# Patient Record
Sex: MALE | Race: WHITE | NOT HISPANIC OR LATINO | Employment: FULL TIME | URBAN - METROPOLITAN AREA
[De-identification: names, ages, dates, MRNs, and addresses within clinical notes are randomized per-mention and may not be internally consistent; named-entity substitution may affect disease eponyms.]

---

## 2017-03-23 ENCOUNTER — TELEPHONE (OUTPATIENT)
Dept: INTERNAL MEDICINE | Facility: CLINIC | Age: 42
End: 2017-03-23

## 2017-03-23 NOTE — TELEPHONE ENCOUNTER
Form completed and faxed to both the Guardian and Mr. Roy.  See copy under media tab.    ----- Message from Dedra Granados sent at 3/22/2017  9:48 AM EDT -----  Regarding: FMLA PAPERWORK  CANDACE LEI    PATIENT CHECKING STATUS OF FMLA PAPERWORK.  HE PHONED THE COMPANY TO RECEIVE AND THEY SAY THEY HAVEN'T GOTTEN IT AS OF YET.    [phone] - ML CONTACT #

## 2017-08-29 ENCOUNTER — OFFICE VISIT (OUTPATIENT)
Dept: INTERNAL MEDICINE | Facility: CLINIC | Age: 42
End: 2017-08-29

## 2017-08-29 VITALS
DIASTOLIC BLOOD PRESSURE: 78 MMHG | HEIGHT: 70 IN | WEIGHT: 162 LBS | OXYGEN SATURATION: 98 % | SYSTOLIC BLOOD PRESSURE: 128 MMHG | RESPIRATION RATE: 16 BRPM | HEART RATE: 80 BPM | BODY MASS INDEX: 23.19 KG/M2

## 2017-08-29 DIAGNOSIS — K29.70 VIRAL GASTRITIS: Primary | ICD-10-CM

## 2017-08-29 LAB
EXPIRATION DATE: NORMAL
FLUAV AG NPH QL: NORMAL
FLUBV AG NPH QL: NORMAL
INTERNAL CONTROL: NORMAL
Lab: NORMAL

## 2017-08-29 PROCEDURE — 99213 OFFICE O/P EST LOW 20 MIN: CPT | Performed by: INTERNAL MEDICINE

## 2017-08-29 PROCEDURE — 87804 INFLUENZA ASSAY W/OPTIC: CPT | Performed by: INTERNAL MEDICINE

## 2017-08-29 RX ORDER — ONDANSETRON HYDROCHLORIDE 8 MG/1
8 TABLET, FILM COATED ORAL EVERY 8 HOURS PRN
Qty: 30 TABLET | Refills: 0 | Status: SHIPPED | OUTPATIENT
Start: 2017-08-29 | End: 2018-08-15

## 2017-08-29 NOTE — PROGRESS NOTES
Subjective   Phillip Roy is a 42 y.o. male.     History of Present Illness   43 yo male with pmhx one day of recurrent nbnb emesis.  He is here with his wife who is having similar symptoms. He has low grade fever and sister in law with similar symptoms  He is down weight, unable to keep fluids down.  No known fever but 99 here.  He has 3 younger children not ill. Had small amount of ETOH at social event over weekend.  The following portions of the patient's history were reviewed and updated as appropriate: allergies, current medications, past family history, past medical history, past social history, past surgical history and problem list.    Review of Systems   Constitutional: Negative.    HENT: Negative.    Eyes: Negative.    Gastrointestinal: Positive for abdominal pain, nausea and vomiting. Negative for diarrhea.   Musculoskeletal:        Feeling weak, diffusely ill   Neurological: Negative.    All other systems reviewed and are negative.      Objective   Physical Exam   Constitutional: He is oriented to person, place, and time. He appears well-developed and well-nourished. No distress.   HENT:   Head: Normocephalic and atraumatic.   Right Ear: External ear normal.   Left Ear: External ear normal.   Mouth/Throat: Oropharynx is clear and moist. No oropharyngeal exudate.   MMM   Eyes: Pupils are equal, round, and reactive to light. Right eye exhibits no discharge. Left eye exhibits no discharge. No scleral icterus.   Cardiovascular: Normal rate and regular rhythm.    No murmur heard.  Pulmonary/Chest: Effort normal. No respiratory distress.   Abdominal: Soft. He exhibits no distension and no mass. There is no tenderness. There is no rebound.   Neurological: He is alert and oriented to person, place, and time.   Skin: Skin is warm.   clammy   Psychiatric: He has a normal mood and affect. His behavior is normal.   Nursing note and vitals reviewed.  Flu neg    Assessment/Plan   Phillip was seen today for  vomiting.    Diagnoses and all orders for this visit:    Viral gastritis  -     POCT Influenza A/B  -     ondansetron (ZOFRAN) 8 MG tablet; Take 1 tablet by mouth Every 8 (Eight) Hours As Needed for Nausea or Vomiting.    Increase fluids, rest  Anticipate resolution of symptoms  Reassurance  [place] diet until pain resolved.   Call if not better 2-3 days or if new symptoms develop.

## 2017-09-21 ENCOUNTER — OFFICE VISIT (OUTPATIENT)
Dept: FAMILY MEDICINE CLINIC | Facility: CLINIC | Age: 42
End: 2017-09-21

## 2017-09-21 VITALS
BODY MASS INDEX: 24.2 KG/M2 | OXYGEN SATURATION: 98 % | HEIGHT: 70 IN | SYSTOLIC BLOOD PRESSURE: 118 MMHG | WEIGHT: 169 LBS | TEMPERATURE: 99 F | HEART RATE: 71 BPM | DIASTOLIC BLOOD PRESSURE: 68 MMHG | RESPIRATION RATE: 14 BRPM

## 2017-09-21 DIAGNOSIS — Z12.5 SCREENING FOR PROSTATE CANCER: ICD-10-CM

## 2017-09-21 DIAGNOSIS — Z00.00 ENCOUNTER FOR HEALTH MAINTENANCE EXAMINATION: Primary | ICD-10-CM

## 2017-09-21 DIAGNOSIS — Z13.220 SCREENING FOR LIPID DISORDERS: ICD-10-CM

## 2017-09-21 DIAGNOSIS — Z13.1 SCREENING FOR DIABETES MELLITUS: ICD-10-CM

## 2017-09-21 DIAGNOSIS — J40 SINOBRONCHITIS: ICD-10-CM

## 2017-09-21 DIAGNOSIS — J32.9 SINOBRONCHITIS: ICD-10-CM

## 2017-09-21 LAB
ALBUMIN SERPL-MCNC: 4.7 G/DL (ref 3.5–5.2)
ALBUMIN/GLOB SERPL: 1.9 G/DL
ALP SERPL-CCNC: 68 U/L (ref 39–117)
ALT SERPL-CCNC: 23 U/L (ref 1–41)
AST SERPL-CCNC: 24 U/L (ref 1–40)
BILIRUB SERPL-MCNC: 0.5 MG/DL (ref 0.1–1.2)
BUN SERPL-MCNC: 15 MG/DL (ref 6–20)
BUN/CREAT SERPL: 16.1 (ref 7–25)
CALCIUM SERPL-MCNC: 9.6 MG/DL (ref 8.6–10.5)
CHLORIDE SERPL-SCNC: 100 MMOL/L (ref 98–107)
CHOLEST SERPL-MCNC: 163 MG/DL (ref 0–200)
CO2 SERPL-SCNC: 26.9 MMOL/L (ref 22–29)
CREAT SERPL-MCNC: 0.93 MG/DL (ref 0.76–1.27)
GLOBULIN SER CALC-MCNC: 2.5 GM/DL
GLUCOSE SERPL-MCNC: 115 MG/DL (ref 65–99)
HBA1C MFR BLD: 5.11 % (ref 4.8–5.6)
HDLC SERPL-MCNC: 47 MG/DL (ref 40–60)
LDLC SERPL CALC-MCNC: 71 MG/DL (ref 0–100)
POTASSIUM SERPL-SCNC: 3.9 MMOL/L (ref 3.5–5.2)
PROT SERPL-MCNC: 7.2 G/DL (ref 6–8.5)
PSA SERPL-MCNC: 0.92 NG/ML (ref 0–4)
SODIUM SERPL-SCNC: 142 MMOL/L (ref 136–145)
TRIGL SERPL-MCNC: 223 MG/DL (ref 0–150)
VLDLC SERPL CALC-MCNC: 44.6 MG/DL (ref 5–40)

## 2017-09-21 PROCEDURE — 99213 OFFICE O/P EST LOW 20 MIN: CPT | Performed by: FAMILY MEDICINE

## 2017-09-21 PROCEDURE — 99396 PREV VISIT EST AGE 40-64: CPT | Performed by: FAMILY MEDICINE

## 2017-09-21 RX ORDER — AMOXICILLIN AND CLAVULANATE POTASSIUM 875; 125 MG/1; MG/1
1 TABLET, FILM COATED ORAL 2 TIMES DAILY
Qty: 20 TABLET | Refills: 0 | Status: SHIPPED | OUTPATIENT
Start: 2017-09-21 | End: 2017-10-01

## 2017-09-21 RX ORDER — PREDNISONE 20 MG/1
40 TABLET ORAL DAILY
Qty: 10 TABLET | Refills: 0 | Status: SHIPPED | OUTPATIENT
Start: 2017-09-21 | End: 2018-08-15

## 2017-09-21 NOTE — PROGRESS NOTES
Subjective   Phillip Roy is a 42 y.o. male.     Chief Complaint   Patient presents with   • Establish Care     Patient needs to establish a care. He needs to have prostate exam.    • Sinus Problem     Patient is having sinus pressure and pain.        HPI     Patient is here for health maintenance exam as well as to discuss a problem that is new to me.  Patient denies any past medical history.  He takes no chronic medications.  He is currently suffering from increased sinus congestion, drainage, and headache for the last 2-3 weeks.  Symptoms are getting worse despite over-the-counter therapy.  No fever or chills.  He is a former smoker.  He drinks about 5 alcoholic beverages a week.  No recreational drug use.  Tries to maintain healthy diet and exercise regimen.  He does have a strong family history of prostate and colon cancer.    The following portions of the patient's history were reviewed and updated as appropriate: allergies, current medications, past family history, past medical history, past social history, past surgical history and problem list.    Review of Systems   HENT: Positive for sinus pain and sinus pressure.    All other systems reviewed and are negative.      Objective  Vitals:    09/21/17 1235   BP: 118/68   Pulse: 71   Resp: 14   Temp: 99 °F (37.2 °C)   SpO2: 98%        Physical Exam   Constitutional: He is oriented to person, place, and time. He appears well-developed and well-nourished. No distress.   HENT:   Head: Normocephalic and atraumatic.   Right Ear: External ear and ear canal normal. No drainage, swelling or tenderness. Tympanic membrane is bulging. Tympanic membrane is not injected and not erythematous. Tympanic membrane mobility is normal. No middle ear effusion. Decreased hearing is noted.   Left Ear: External ear and ear canal normal. No drainage, swelling or tenderness. Tympanic membrane is bulging. Tympanic membrane is not injected and not erythematous. Tympanic membrane mobility  is normal.  No middle ear effusion. Decreased hearing is noted.   Nose: Mucosal edema and rhinorrhea present. Right sinus exhibits maxillary sinus tenderness and frontal sinus tenderness. Left sinus exhibits maxillary sinus tenderness and frontal sinus tenderness.   Mouth/Throat: Uvula is midline. Posterior oropharyngeal erythema present. No oropharyngeal exudate, posterior oropharyngeal edema or tonsillar abscesses. No tonsillar exudate.   Eyes: Conjunctivae and EOM are normal. Pupils are equal, round, and reactive to light. Right eye exhibits no discharge. Left eye exhibits no discharge. No scleral icterus.   Neck: Normal range of motion. Neck supple.   Cardiovascular: Normal rate, regular rhythm and normal heart sounds.  Exam reveals no friction rub.    No murmur heard.  Pulmonary/Chest: Effort normal and breath sounds normal. No respiratory distress. He has no wheezes. He has no rales.   Abdominal: Soft. Bowel sounds are normal. He exhibits no distension. There is no tenderness. There is no rebound and no guarding.   Lymphadenopathy:     He has no cervical adenopathy.   Neurological: He is alert and oriented to person, place, and time.   Skin: Skin is warm and dry. He is not diaphoretic.   Nursing note and vitals reviewed.        Current Outpatient Prescriptions:   •  Cetirizine HCl (ZYRTEC PO), Take 10 mg by mouth Daily., Disp: , Rfl:   •  amoxicillin-clavulanate (AUGMENTIN) 875-125 MG per tablet, Take 1 tablet by mouth 2 (Two) Times a Day for 10 days. 1 tab PO BID x 10 days for infection, Disp: 20 tablet, Rfl: 0  •  ondansetron (ZOFRAN) 8 MG tablet, Take 1 tablet by mouth Every 8 (Eight) Hours As Needed for Nausea or Vomiting., Disp: 30 tablet, Rfl: 0  •  predniSONE (DELTASONE) 20 MG tablet, Take 2 tablets by mouth Daily., Disp: 10 tablet, Rfl: 0    Procedures    Lab Results (most recent)     None          Assessment/Plan   Phillip was seen today for establish care and sinus problem.    Diagnoses and all orders  for this visit:    Encounter for health maintenance examination    Screening for lipid disorders  -     Lipid Panel    Screening for diabetes mellitus  -     Comprehensive Metabolic Panel  -     Hemoglobin A1c    Screening for prostate cancer  -     PSA    Sinobronchitis  -     amoxicillin-clavulanate (AUGMENTIN) 875-125 MG per tablet; Take 1 tablet by mouth 2 (Two) Times a Day for 10 days. 1 tab PO BID x 10 days for infection  -     predniSONE (DELTASONE) 20 MG tablet; Take 2 tablets by mouth Daily.    Preventative health maintenance and screening as above.  We'll treat as above for sinobronchitis.  Follow-up as needed.    Return for As needed.      Daryl Purdy MD

## 2018-05-11 ENCOUNTER — OUTSIDE FACILITY SERVICE (OUTPATIENT)
Dept: SURGERY | Facility: CLINIC | Age: 43
End: 2018-05-11

## 2018-05-11 PROCEDURE — 45378 DIAGNOSTIC COLONOSCOPY: CPT | Performed by: SURGERY

## 2018-08-15 ENCOUNTER — OFFICE VISIT (OUTPATIENT)
Dept: FAMILY MEDICINE CLINIC | Facility: CLINIC | Age: 43
End: 2018-08-15

## 2018-08-15 VITALS
SYSTOLIC BLOOD PRESSURE: 112 MMHG | HEIGHT: 69 IN | WEIGHT: 173 LBS | OXYGEN SATURATION: 98 % | BODY MASS INDEX: 25.62 KG/M2 | HEART RATE: 71 BPM | TEMPERATURE: 98.6 F | DIASTOLIC BLOOD PRESSURE: 72 MMHG

## 2018-08-15 DIAGNOSIS — B34.9 VIRAL ILLNESS: Primary | ICD-10-CM

## 2018-08-15 PROCEDURE — 99213 OFFICE O/P EST LOW 20 MIN: CPT | Performed by: NURSE PRACTITIONER

## 2018-08-15 RX ORDER — IBUPROFEN 400 MG/1
400 TABLET ORAL
COMMUNITY
End: 2020-08-13

## 2018-08-20 ENCOUNTER — OFFICE VISIT (OUTPATIENT)
Dept: FAMILY MEDICINE CLINIC | Facility: CLINIC | Age: 43
End: 2018-08-20

## 2018-08-20 VITALS
HEIGHT: 69 IN | OXYGEN SATURATION: 100 % | TEMPERATURE: 98.4 F | DIASTOLIC BLOOD PRESSURE: 74 MMHG | WEIGHT: 174 LBS | HEART RATE: 69 BPM | BODY MASS INDEX: 25.77 KG/M2 | SYSTOLIC BLOOD PRESSURE: 104 MMHG

## 2018-08-20 DIAGNOSIS — G47.33 OSA (OBSTRUCTIVE SLEEP APNEA): Primary | ICD-10-CM

## 2018-08-20 PROCEDURE — 99213 OFFICE O/P EST LOW 20 MIN: CPT | Performed by: FAMILY MEDICINE

## 2018-08-20 RX ORDER — CETIRIZINE HYDROCHLORIDE 10 MG/1
10 TABLET ORAL DAILY
COMMUNITY
End: 2020-08-13

## 2018-08-20 NOTE — PROGRESS NOTES
Phillip Roy is a 43 y.o. male.     Chief Complaint   Patient presents with   • Snoring     Patient was told by his wife that he snores and quits breathing while he is sleeping. Patient is feeling very fatigued leticia after 8 hours of sleep    • Skin Problem     Patient tried to remove on a skin tag under his right arm that he wants checked        HPI     Patient presents the office today stating that his wife has recently stated that his snoring has gotten quite bad and that he will often times stop breathing during the night.  He states that he'll wake up the morning typically feeling exhausted and not having had a restful night sleep.  Does have a family history of sleep apnea.    The following portions of the patient's history were reviewed and updated as appropriate: allergies, current medications, past family history, past medical history, past social history, past surgical history and problem list.    Review of Systems   Constitutional: Positive for fatigue.   All other systems reviewed and are negative.      Objective  Vitals:    08/20/18 1314   BP: 104/74   Pulse: 69   Temp: 98.4 °F (36.9 °C)   SpO2: 100%       Physical Exam   Constitutional: He is oriented to person, place, and time. He appears well-developed and well-nourished. No distress.   HENT:   Head: Normocephalic and atraumatic.   Right Ear: External ear normal.   Left Ear: External ear normal.   Nose: Nose normal.   Mouth/Throat: Oropharynx is clear and moist.   Eyes: Pupils are equal, round, and reactive to light. Conjunctivae and EOM are normal. Right eye exhibits no discharge. Left eye exhibits no discharge. No scleral icterus.   Cardiovascular: Normal rate, regular rhythm and normal heart sounds.    Pulmonary/Chest: Effort normal and breath sounds normal. No respiratory distress. He has no wheezes. He has no rales.   Abdominal: Soft. Bowel sounds are normal. He exhibits no distension. There is no tenderness.   Neurological: He is alert and  oriented to person, place, and time.   Skin: Skin is warm and dry. He is not diaphoretic.   Nursing note and vitals reviewed.        Current Outpatient Prescriptions:   •  cetirizine (zyrTEC) 10 MG tablet, Take 10 mg by mouth Daily., Disp: , Rfl:   •  ibuprofen (ADVIL,MOTRIN) 400 MG tablet, Take 400 mg by mouth., Disp: , Rfl:     Procedures    Lab Results (most recent)     None              Phillip was seen today for snoring and skin problem.    Diagnoses and all orders for this visit:    RAMSES (obstructive sleep apnea)  -     Ambulatory Referral to Sleep Medicine      Advised patient on the need to get a sleep study.  We will place a referral.    Return for As needed.      Daryl Purdy MD

## 2018-10-02 ENCOUNTER — OFFICE VISIT (OUTPATIENT)
Dept: SLEEP MEDICINE | Facility: HOSPITAL | Age: 43
End: 2018-10-02
Attending: INTERNAL MEDICINE

## 2018-10-02 VITALS
HEART RATE: 70 BPM | BODY MASS INDEX: 25.21 KG/M2 | WEIGHT: 170.2 LBS | DIASTOLIC BLOOD PRESSURE: 74 MMHG | OXYGEN SATURATION: 98 % | HEIGHT: 69 IN | SYSTOLIC BLOOD PRESSURE: 122 MMHG

## 2018-10-02 DIAGNOSIS — R06.83 SNORING: ICD-10-CM

## 2018-10-02 DIAGNOSIS — G47.10 HYPERSOMNIA: Primary | ICD-10-CM

## 2018-10-02 PROCEDURE — G0463 HOSPITAL OUTPT CLINIC VISIT: HCPCS

## 2018-10-02 NOTE — PROGRESS NOTES
[facility]- Sleep Disorders Center      Patient Care Team:  Daryl Purdy MD as PCP - General (Family Medicine)    Referring Provider: Daryl Purdy MD    Chief complaint:   Feeling tired throughout the day    History of present illness:    This is a 43-year-old male patient who presented for evaluation of daytime fatigue and sleepiness.  He stated that this has been going on for years.  He always attributed that to work, stress and having kids.  He said that he can fall asleep very easily if not engaged in activities.    Sleep schedule:  -Bedtime: 10:30 PM on the weekdays.  11 to 11:30 PM on the weekends  -Sleep latency: 10-15 minutes  -Wake up time: 5:45 AM on the weekdays and 7 AM on the weekends , does not feel refreshed  -Nocturnal awakening: None.  He stated that his very hard sleeper.  -Perceived sleep hours: 7-8    ESS: Total score: 13     He indicated rare episodes of sleep paralysis upon awakening which may occur once a year.  He denies sleep hallucination.  He denies cataplexy.  He stated that he jerks in his sleep sometimes which may awaken him but denies symptoms of restless leg syndrome.  He said that he was asleep walker when he was a child.  He also indicated occasional vivid dreams.    He said that he snores but it does not bother him but does not awaken him at night.  His snoring disturb his wife who has awakened him intermittently to change position.  He was told that he occasionally stops breathing as well.  He also reported grinding his teeth at night as told by his dentist.    Review of Systems  Constitutional: No fever or chills. No changes in appetite.   ENMT: No sinus congestion, postnasal drip, hoarsness  Cardiovascular: No chest pain, palpitation or legs swelling.    Respiratory: No dyspnea, cough or wheezing.  Gastrointestinal: No constipation, diarrhea, abdominal pain or acid reflux  Neurology: No headache, weakness, numbness or dizziness.   Musculoskeletal:  "No joints pain, stiffness or swelling.   Psychiatry: He has anxiety and depression (not a formal diagnosis).  He does not take treatment for that.  Hem/Lymphatic: No swollen glands or easy bruising.  Integumentary: No rash.  Endocrinology: No excessive thirst, cold or warm intolerance.   Urinary: No dysuria, bloody urine or frequent urination.     History  Past Medical History:   Diagnosis Date   • Viral gastritis 8/29/2017   ,   Past Surgical History:   Procedure Laterality Date   • COLONOSCOPY     ,   Family History   Problem Relation Age of Onset   • Cancer Mother    • Colon cancer Father    ,   Social History   Substance Use Topics   • Smoking status: Former Smoker     Types: Cigarettes     Quit date: 2001   • Smokeless tobacco: Former User   • Alcohol use 3.0 oz/week     5 Standard drinks or equivalent per week    and Allergies:  Patient has no known allergies.    Medications:    Current Outpatient Prescriptions:   •  cetirizine (zyrTEC) 10 MG tablet, Take 10 mg by mouth Daily., Disp: , Rfl:   •  ibuprofen (ADVIL,MOTRIN) 400 MG tablet, Take 400 mg by mouth., Disp: , Rfl:       Vital Signs:  Vitals:    10/02/18 1300   BP: 122/74   Pulse: 70   SpO2: 98%   Weight: 77.2 kg (170 lb 3.2 oz)   Height: 175.3 cm (69\")     Body mass index is 25.13 kg/m².  Neck Circumference: 16 inches     Physical Exam:  Neck Circumference: 16 inches     Constitutional: Not in acute distress.  Eyes: Injected conjunctiva, EOMI.  ENMT: Watson score 3. Mallampati score 1. No oral thrush. Tonsils grade 1..  Neck:No thyromegaly.    Heart: Regular rhythm and rate, no murmur  Lungs: Good and equal air entry bilaterally. No crackles or wheezing.         Abdomen: Obese.   Extremities: No cyanosis, clubbing or pitting edema. Moves all extremities.  Neuro: Conscious, alert, oriented x3.   Psych: Appropriate mood and affect.    Integumentary: No rash  Lymphatic: No palpable cervical or supraclavicular lymph nodes.         Assessment:  Diagnoses " and all orders for this visit:    Hypersomnia  -     Polysomnography 4 or more parameters; Future  -     Multiple sleep latency test without CPAP; Future    Snoring        Plan:  I'll get an in lab polysomnography followed by MSLT.  Patient exhibited some symptoms of hypersomnolence despite sleeping adequate hours concerning for central hypersomnia disorder, specifically idiopathic hypersomnia due to strong sleep inertia.  I explained the diagnosis of the patient and testing.    Concerning snoring, I do not have a strong suspicion for obstructive sleep apnea.  Weight loss and positional therapy are recommended for this issue.        Caio Kent MD  10/02/18  1:40 PM    This note was dictated utilizing Dragon dictation

## 2018-11-05 ENCOUNTER — HOSPITAL ENCOUNTER (OUTPATIENT)
Dept: SLEEP MEDICINE | Facility: HOSPITAL | Age: 43
Discharge: HOME | End: 2018-11-05
Attending: INTERNAL MEDICINE | Admitting: INTERNAL MEDICINE

## 2018-11-05 DIAGNOSIS — G47.10 HYPERSOMNIA: ICD-10-CM

## 2018-11-05 PROCEDURE — 95810 POLYSOM 6/> YRS 4/> PARAM: CPT

## 2018-11-06 ENCOUNTER — HOSPITAL ENCOUNTER (OUTPATIENT)
Dept: SLEEP MEDICINE | Facility: HOSPITAL | Age: 43
Discharge: HOME | End: 2018-11-06
Attending: INTERNAL MEDICINE | Admitting: INTERNAL MEDICINE

## 2018-11-06 ENCOUNTER — TRANSCRIBE ORDERS (OUTPATIENT)
Dept: SLEEP MEDICINE | Facility: HOSPITAL | Age: 43
End: 2018-11-06

## 2018-11-06 DIAGNOSIS — G47.10 HYPERSOMNIA: Primary | ICD-10-CM

## 2018-11-06 DIAGNOSIS — G47.10 HYPERSOMNIA: ICD-10-CM

## 2018-11-06 LAB
AMPHET+METHAMPHET UR QL: NEGATIVE
BARBITURATES UR QL SCN: NEGATIVE
BENZODIAZ UR QL SCN: NEGATIVE
CANNABINOIDS SERPL QL: NEGATIVE
COCAINE UR QL: NEGATIVE
METHADONE UR QL SCN: NEGATIVE
OPIATES UR QL: NEGATIVE
OXYCODONE UR QL SCN: NEGATIVE

## 2018-11-06 PROCEDURE — 80307 DRUG TEST PRSMV CHEM ANLYZR: CPT | Performed by: INTERNAL MEDICINE

## 2018-11-06 PROCEDURE — 95805 MULTIPLE SLEEP LATENCY TEST: CPT

## 2018-11-28 ENCOUNTER — TELEPHONE (OUTPATIENT)
Dept: SLEEP MEDICINE | Facility: HOSPITAL | Age: 43
End: 2018-11-28

## 2018-11-28 NOTE — TELEPHONE ENCOUNTER
Tech called pt, pt answered and stated was driving and would call back. Patient needs to be scheduled follow up appt with Dr. Kent to discuss testing results. MAB

## 2018-12-11 ENCOUNTER — TELEPHONE (OUTPATIENT)
Dept: SLEEP MEDICINE | Facility: HOSPITAL | Age: 43
End: 2018-12-11

## 2018-12-11 ENCOUNTER — OFFICE VISIT (OUTPATIENT)
Dept: SLEEP MEDICINE | Facility: HOSPITAL | Age: 43
End: 2018-12-11
Attending: INTERNAL MEDICINE

## 2018-12-11 VITALS
HEIGHT: 69 IN | BODY MASS INDEX: 25.33 KG/M2 | WEIGHT: 171 LBS | HEART RATE: 80 BPM | DIASTOLIC BLOOD PRESSURE: 81 MMHG | SYSTOLIC BLOOD PRESSURE: 135 MMHG

## 2018-12-11 DIAGNOSIS — G47.33 OBSTRUCTIVE SLEEP APNEA, ADULT: Primary | ICD-10-CM

## 2018-12-11 DIAGNOSIS — G47.10 HYPERSOMNIA: ICD-10-CM

## 2018-12-11 PROCEDURE — G0463 HOSPITAL OUTPT CLINIC VISIT: HCPCS

## 2018-12-11 NOTE — PROGRESS NOTES
[facility]- Sleep Disorders Center                          Chief Complaint:   Follow up result of the sleep study.    History of present illness:   This is a 43 y.o. male  Patient with no PMH, complaining of daytime sleepiness. He had 1 episode of sleep paralysis. He also reported loud snoring which disturbs his wife.    Sleep apnea/hypersomnia were suspected.  Patient underwent PSG followed by emesis of tea.    PSG showed minimal sleep apnea per RDI criteria.  MSL T showed mean sleep latency of 12 minutes.  Patient is here today to discuss the result of the past.    His symptoms are the same since the last visit.  However, he reported some symptoms of lack of motivation and fatigue which bother him the most rather than snoring.    ESS: Total score: 9     REVIEW OF SYSTEMS:   HEENT: No nasal congestion or postnasal drip   CARDIOVASCULAR: No chest pain, chest pressure or chest discomfort. No palpitations or edema.   RESPIRATORY: No shortness of breath, cough or sputum.   GASTROINTESTINAL: No abdominal bloating or reflux   NEUROLOGICAL/PSYCHOATRY: No depression nor anxiety    Past Medical History:  Past Medical History:   Diagnosis Date   • Viral gastritis 2017   ,   Past Surgical History:   Procedure Laterality Date   • COLONOSCOPY     ,   Social History     Tobacco Use   • Smoking status: Former Smoker     Types: Cigarettes     Last attempt to quit:      Years since quittin.9   • Smokeless tobacco: Former User   Substance Use Topics   • Alcohol use: Yes     Alcohol/week: 3.0 oz     Types: 5 Standard drinks or equivalent per week   • Drug use: Not on file    and Allergies:  Patient has no known allergies.    Medication Review:     Current Outpatient Medications:   •  cetirizine (zyrTEC) 10 MG tablet, Take 10 mg by mouth Daily., Disp: , Rfl:   •  ibuprofen (ADVIL,MOTRIN) 400 MG tablet, Take 400 mg by mouth., Disp: , Rfl:       Vital Signs:  Vitals:    18 1500  "  BP: 135/81   Pulse: 80   Weight: 77.6 kg (171 lb)   Height: 175.3 cm (69\")     Body mass index is 25.25 kg/m².  Neck Circumference: 14.5 inches       Physical Exam:   General Appearance:    Alert, cooperative, in no acute distress   HEENT:  Watson score 3. Mallampati score 1. No oral thrush. Tonsils grade 1.    Lungs:     Clear to auscultation,respirations regular, even and                  unlabored    Heart:    Regular rhythm and normal rate, normal S1 and S2, no            murmur, no gallop, no rub, no click   Abdomen:     Normal bowel sounds, no masses, no organomegaly, soft        non-tender, non-distended, no guarding, no rebound                tenderness   Neuro:   Conscious, alert, oriented x3. Appropriate mood and affect.    Extremities:   Moves all extremities well, no edema, no cyanosis, no             Redness              Diagnostic data:  Polysomnography was performed on: 11/5/18  PSG: AHI= 0.3; RDI= 6; SE= 75; sleep time=360 min. No PLM observed.        ASSESSMENT:  Diagnoses and all orders for this visit:    Obstructive sleep apnea, adult    Hypersomnia        PLAN:  I discussed the result of the sleep study with the patient.  We talked about treatment with OAT, CPAP, the pros and cons of each modality.  We also talked about the possibility of depression.    Patient prefers to use CPAP therapy.    Patient is significantly symptomatic and I think he would benefit from CPAP therapy.  I will start him on auto CPAP and readdress his issues with hypersomnia afterward.  I encouraged him to use the CPAP regularly      Time>15 min face to face >1/2 directed toward counseling and coordination of care          This note was dictated utilizing Dragon dictation  "

## 2018-12-11 NOTE — TELEPHONE ENCOUNTER
Tech called pts home #, pt answered. Tech went over DME information patient chose [facility] for pap therapy equipment. Set up information sent to [facility] and scheduled F/U appt. MAB

## 2019-01-23 DIAGNOSIS — Z20.828 EXPOSURE TO THE FLU: Primary | ICD-10-CM

## 2019-01-23 RX ORDER — OSELTAMIVIR PHOSPHATE 75 MG/1
75 CAPSULE ORAL DAILY
Qty: 10 CAPSULE | Refills: 0 | Status: SHIPPED | OUTPATIENT
Start: 2019-01-23 | End: 2020-08-13

## 2019-03-12 ENCOUNTER — OFFICE VISIT (OUTPATIENT)
Dept: SLEEP MEDICINE | Facility: HOSPITAL | Age: 44
End: 2019-03-12
Attending: INTERNAL MEDICINE

## 2019-03-12 VITALS
DIASTOLIC BLOOD PRESSURE: 69 MMHG | SYSTOLIC BLOOD PRESSURE: 108 MMHG | WEIGHT: 170 LBS | OXYGEN SATURATION: 96 % | HEART RATE: 62 BPM | HEIGHT: 69 IN | BODY MASS INDEX: 25.18 KG/M2

## 2019-03-12 DIAGNOSIS — G47.33 OBSTRUCTIVE SLEEP APNEA, ADULT: Primary | ICD-10-CM

## 2019-03-12 PROCEDURE — G0463 HOSPITAL OUTPT CLINIC VISIT: HCPCS

## 2019-03-12 NOTE — PROGRESS NOTES
[facility]- Sleep Disorders Center                          Chief Complaint:   Follow up for obstructive sleep apnea    History of present illness:   Subjective    This is a 43 y.o. male  Patient with no PMH, complaining of daytime sleepiness. He had 1 episode of sleep paralysis. He also reported loud snoring which disturbs his wife. Initial ESS was 13.     Sleep apnea/hypersomnia were suspected.  Patient underwent PSG followed by MSLT.     PSG showed minimal sleep apnea per RDI criteria.  MSLT showed mean sleep latency of 12 minutes.  Patient is here today to discuss the result of the past.    He's here for follow up on CPAP use.     He said that he was using it consistently initially but then had issues later on and could not tolerate it well. He stated that he was fitted with a wrong mask. He was then switched to the nasal pillows. He also stated that the CPAP was coming off his face at night and causing air leak which disturbs his wife and disturb him as well.  Due to all these issues, he decided to stop using the CPAP.  He also stated that has been exercising regularly and he felt like it is getting more refreshed and less symptomatic once he started regular exercise.    Sleep schedule:  -Bedtime: 10 PM  -Sleep latency: Not too long  -Wake up time: 5:45 AM , does  feel refreshed  -Nocturnal awakenin times because of dreams.  No difficulties going back to sleep.  -Perceived sleep hours: 7    Mask: Nasal pillows which does fit well.  Some air leak but no dry mouth  DME: [facility]    ESS: Total score: 6     REVIEW OF SYSTEMS:   HEENT: No nasal congestion or postnasal drip   CARDIOVASCULAR: No chest pain, chest pressure or chest discomfort. No palpitations or edema.   RESPIRATORY: No shortness of breath, cough or sputum.   GASTROINTESTINAL: No abdominal bloating or reflux   NEUROLOGICAL/PSYCHOATRY: No depression nor anxiety    Past Medical History:  Past Medical History:  "  Diagnosis Date   • Viral gastritis 2017   ,   Past Surgical History:   Procedure Laterality Date   • COLONOSCOPY     ,   Social History     Tobacco Use   • Smoking status: Former Smoker     Types: Cigarettes     Last attempt to quit:      Years since quittin.2   • Smokeless tobacco: Former User   Substance Use Topics   • Alcohol use: Yes     Alcohol/week: 3.0 oz     Types: 5 Standard drinks or equivalent per week   • Drug use: Not on file    and Allergies:  Patient has no known allergies.    Medication Review:     Current Outpatient Medications:   •  cetirizine (zyrTEC) 10 MG tablet, Take 10 mg by mouth Daily., Disp: , Rfl:   •  ibuprofen (ADVIL,MOTRIN) 400 MG tablet, Take 400 mg by mouth., Disp: , Rfl:   •  oseltamivir (TAMIFLU) 75 MG capsule, Take 1 capsule by mouth Daily., Disp: 10 capsule, Rfl: 0        Objective   Vital Signs:  Vitals:    19 1554   BP: 108/69   Pulse: 62   SpO2: 96%   Weight: 77.1 kg (170 lb)   Height: 174 cm (68.5\")     Body mass index is 25.47 kg/m².          Physical Exam:   General Appearance:    Alert, cooperative, in no acute distress   ENMT:  Watson score 3. Mallampati score 1. No oral thrush. Tonsils grade 1.    Neck:  Large. Trachea midline. No thyromegaly.   Lungs:     Clear to auscultation,respirations regular, even and                  unlabored    Heart:    Regular rhythm and normal rate, normal S1 and S2, no            Murmur.   Abdomen:     Obese.  Soft.  No tenderness.  No HSM    Neuro:   Conscious, alert, oriented x3. Appropriate mood and affect.    Extremities:   Moves all extremities well, no edema, no cyanosis, no             Redness              Diagnostic data:  Polysomnography was performed on: 18  PSG: AHI= 0.3; RDI= 6; SE= 75; sleep time=360 min. No PLM observed.        CPAP download showed:  Date: Last 30 days  Usage (days): 66 %  Days used>4h: 53 %  AHI: 1.3/h  Leak: 2 min and 18 seconds  Usage: 5 h and 14 min  P 90% : 6.4  Auto CPAP: 5-15 " cm H2O    Assessment   1. RAMSES, mild  2. Hypersomnia, resolved  3. Overweight (BMI 25)        Recommendations:  Not interested in CPAP therapy so much anymore.  In addition, he was questioning the need for CPAP therapy from a medical perspective.  I did discuss with him that for the degree of his sleep apnea he has, there's no need for sleep apnea therapy unless he's significantly symptomatic.  Talked about treatment with oral appliance but again he does not appear to be significantly symptomatic.  He opted to not pursue any of these treatment options.  Therefore, he can go ahead and discontinue the CPAP.  He will call us back if he experience significant problems.  Otherwise, I did discuss with him continuing exercise and diet to lose weight and we also talked about using over-the-counter oral appliance and positional therapy with wedge pillow.    Time spent greater than 15 minutes, greater than half of the time directed toward counseling and coordination of care as above.                This note was dictated utilizing Dragon dictation

## 2020-08-13 ENCOUNTER — OFFICE VISIT (OUTPATIENT)
Dept: INTERNAL MEDICINE | Facility: CLINIC | Age: 45
End: 2020-08-13

## 2020-08-13 VITALS
WEIGHT: 163.6 LBS | BODY MASS INDEX: 24.23 KG/M2 | HEART RATE: 64 BPM | HEIGHT: 69 IN | OXYGEN SATURATION: 99 % | SYSTOLIC BLOOD PRESSURE: 116 MMHG | DIASTOLIC BLOOD PRESSURE: 62 MMHG

## 2020-08-13 DIAGNOSIS — M79.672 CHRONIC FOOT PAIN, LEFT: ICD-10-CM

## 2020-08-13 DIAGNOSIS — Z00.00 ANNUAL PHYSICAL EXAM: Primary | ICD-10-CM

## 2020-08-13 DIAGNOSIS — R10.13 EPIGASTRIC DISCOMFORT: ICD-10-CM

## 2020-08-13 DIAGNOSIS — G89.29 CHRONIC FOOT PAIN, LEFT: ICD-10-CM

## 2020-08-13 DIAGNOSIS — E78.5 HYPERLIPIDEMIA, UNSPECIFIED HYPERLIPIDEMIA TYPE: ICD-10-CM

## 2020-08-13 DIAGNOSIS — Z23 NEED FOR TD VACCINE: ICD-10-CM

## 2020-08-13 PROBLEM — B34.9 VIRAL ILLNESS: Status: RESOLVED | Noted: 2018-08-15 | Resolved: 2020-08-13

## 2020-08-13 PROBLEM — K29.70 VIRAL GASTRITIS: Status: RESOLVED | Noted: 2017-08-29 | Resolved: 2020-08-13

## 2020-08-13 PROBLEM — K63.5 COLON POLYPS: Status: ACTIVE | Noted: 2020-08-13

## 2020-08-13 PROCEDURE — 99396 PREV VISIT EST AGE 40-64: CPT | Performed by: FAMILY MEDICINE

## 2020-08-13 PROCEDURE — 90715 TDAP VACCINE 7 YRS/> IM: CPT | Performed by: FAMILY MEDICINE

## 2020-08-13 PROCEDURE — 90471 IMMUNIZATION ADMIN: CPT | Performed by: FAMILY MEDICINE

## 2020-08-13 NOTE — PROGRESS NOTES
Date of Encounter: 2020  Patient: John Roy,  1975    Subjective   History of Presenting Illness  Chief complaint: Annual physical    Epigastric discomfort: Substernal tightness, usually in the morning, for approximately 15-20 minutes, happens less than once per month.  Not associated with shortness of breath, palpitations, or exertion.  Runs several times per week and does not have this happen during those episodes.  No childhood history of asthma.    Chronic left foot pain: Has been to physical therapy in the past, related to not wearing shoes when at home, needs to start wearing shoes and doing the exercises again.    History of colon polyps discovered on colonoscopy, gets every 5 years.    , 3 children.  Sexually active.  Prior 15-pack-year smoker quit at age 29.  4 alcoholic drinks per week.  Uses cannabis recreationally.  Exercises by running 6 miles per week.  Healthy diet.  Has a living will.  Due for Td.    Review of Systems:  Negative for fever, congestion, chest pain upon exertion, shortness of breath, vision changes, vomiting, dysuria, lymphadenopathy, muscle weakness, numbness, mood changes, rashes.    The following portions of the patient's history were reviewed and updated as appropriate: allergies, current medications, past family history, past medical history, past social history, past surgical history and problem list.    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  No Known Allergies  Social History     Tobacco Use   • Smoking status: Former Smoker     Types: Cigarettes     Last attempt  "to quit: 2001     Years since quittin.6   • Smokeless tobacco: Former User   Substance Use Topics   • Alcohol use: Yes     Alcohol/week: 5.0 standard drinks     Types: 5 Standard drinks or equivalent per week   • Drug use: Not on file          Objective   Physical Exam  Vitals:    20 0915   BP: 116/62   Pulse: 64   SpO2: 99%   Weight: 74.2 kg (163 lb 9.6 oz)   Height: 175.3 cm (69\")     Body mass index is 24.16 kg/m².    Constitutional: NAD.  Eyes: EOMI. PERRLA. Normal conjunctiva.  Ear, nose, mouth, throat: No tonsillar exudates or erythema.   Normal nasal mucosa. Normal external ear canals and TMs bilaterally.  Cardiovascular: RRR. No murmurs. No LE edema b/l. Radial pulses 2+ bilaterally.  Pulmonary: CTA b/l. Good effort.  Integumentary: No rashes or wounds on face or upper extremities.  Lymphatic: No anterior cervical lymphadenopathy.  Endocrine: No thyromegaly or palpable thyroid nodules.  Psychiatric: Normal affect. Normal thought content.  Gastrointestinal: Nondistended.  Mild epigastric discomfort. No hepatosplenomegaly. Normal bowel sounds.       Assessment/Plan   Assessment and Plan  Pleasant 45-year-old male with hyperlipidemia, colon polyps, who presents for the following:    Diagnoses and all orders for this visit:    1. Annual physical exam (Primary):We discussed preventative care including age and patient-appropriate immunizations and cancer screening. We also discussed the importance of exercise and a healthy diet. This is their annual preventative exam.    2. Hyperlipidemia, unspecified hyperlipidemia type  -     Comprehensive Metabolic Panel  -     Lipid Panel    3. Epigastric discomfort: No red flags for cardiac etiology, very infrequent, unremarkable cardiopulmonary exam, suspect morning gastritis.  If worsening will consider further evaluation and treatment, for now watchful waiting.  -     CBC & Differential    4. Chronic foot pain, left: Patient to wear shoes at home, restart home " exercises    5. Need for Td vaccine  -     Td Vaccine Greater Than or Equal To 6yo Preservative Free IM    Return to office in 1 year for annual physical or earlier as needed.    Venkatesh Davis MD  Family Medicine  O: [phone]  C: [phone]    Disclaimer: Parts of this note were dictated by speech recognition. Minor errors in transcription may be present. Please call if questions.

## 2020-08-14 LAB
ALBUMIN SERPL-MCNC: 5 G/DL (ref 4–5)
ALBUMIN/GLOB SERPL: 1.8 {RATIO} (ref 1.2–2.2)
ALP SERPL-CCNC: 63 IU/L (ref 39–117)
ALT SERPL-CCNC: 21 IU/L (ref 0–44)
AST SERPL-CCNC: 26 IU/L (ref 0–40)
BASOPHILS # BLD AUTO: 0 X10E3/UL (ref 0–0.2)
BASOPHILS NFR BLD AUTO: 1 %
BILIRUB SERPL-MCNC: 0.9 MG/DL (ref 0–1.2)
BUN SERPL-MCNC: 13 MG/DL (ref 6–24)
BUN/CREAT SERPL: 11 (ref 9–20)
CALCIUM SERPL-MCNC: 10.1 MG/DL (ref 8.7–10.2)
CHLORIDE SERPL-SCNC: 98 MMOL/L (ref 96–106)
CHOLEST SERPL-MCNC: 200 MG/DL (ref 100–199)
CO2 SERPL-SCNC: 25 MMOL/L (ref 20–29)
CREAT SERPL-MCNC: 1.21 MG/DL (ref 0.76–1.27)
EOSINOPHIL # BLD AUTO: 0.1 X10E3/UL (ref 0–0.4)
EOSINOPHIL NFR BLD AUTO: 2 %
ERYTHROCYTE [DISTWIDTH] IN BLOOD BY AUTOMATED COUNT: 12.6 % (ref 11.6–15.4)
GLOBULIN SER CALC-MCNC: 2.8 G/DL (ref 1.5–4.5)
GLUCOSE SERPL-MCNC: 89 MG/DL (ref 65–99)
HCT VFR BLD AUTO: 47.2 % (ref 37.5–51)
HDLC SERPL-MCNC: 57 MG/DL
HGB BLD-MCNC: 15.7 G/DL (ref 13–17.7)
IMM GRANULOCYTES # BLD AUTO: 0 X10E3/UL (ref 0–0.1)
IMM GRANULOCYTES NFR BLD AUTO: 0 %
LDLC SERPL CALC-MCNC: 122 MG/DL (ref 0–99)
LYMPHOCYTES # BLD AUTO: 2.3 X10E3/UL (ref 0.7–3.1)
LYMPHOCYTES NFR BLD AUTO: 35 %
MCH RBC QN AUTO: 30.1 PG (ref 26.6–33)
MCHC RBC AUTO-ENTMCNC: 33.3 G/DL (ref 31.5–35.7)
MCV RBC AUTO: 91 FL (ref 79–97)
MONOCYTES # BLD AUTO: 0.4 X10E3/UL (ref 0.1–0.9)
MONOCYTES NFR BLD AUTO: 7 %
NEUTROPHILS # BLD AUTO: 3.7 X10E3/UL (ref 1.4–7)
NEUTROPHILS NFR BLD AUTO: 55 %
PLATELET # BLD AUTO: 219 X10E3/UL (ref 150–450)
POTASSIUM SERPL-SCNC: 4.5 MMOL/L (ref 3.5–5.2)
PROT SERPL-MCNC: 7.8 G/DL (ref 6–8.5)
RBC # BLD AUTO: 5.21 X10E6/UL (ref 4.14–5.8)
SODIUM SERPL-SCNC: 139 MMOL/L (ref 134–144)
TRIGL SERPL-MCNC: 104 MG/DL (ref 0–149)
VLDLC SERPL CALC-MCNC: 21 MG/DL (ref 5–40)
WBC # BLD AUTO: 6.6 X10E3/UL (ref 3.4–10.8)

## 2020-08-17 PROBLEM — E78.01 FAMILIAL HYPERCHOLESTEROLEMIA: Status: ACTIVE | Noted: 2020-08-13

## 2020-08-17 NOTE — PROGRESS NOTES
Discussed the results over the phone with the patient as previously agreed.    HLD, good lifestyle, likely genetic ctm, statin not indicated at this time

## 2020-12-01 ENCOUNTER — OFFICE VISIT (OUTPATIENT)
Dept: INTERNAL MEDICINE | Facility: CLINIC | Age: 45
End: 2020-12-01

## 2020-12-01 VITALS
BODY MASS INDEX: 24.26 KG/M2 | DIASTOLIC BLOOD PRESSURE: 70 MMHG | OXYGEN SATURATION: 98 % | SYSTOLIC BLOOD PRESSURE: 114 MMHG | TEMPERATURE: 97.3 F | HEIGHT: 69 IN | WEIGHT: 163.8 LBS | HEART RATE: 66 BPM

## 2020-12-01 DIAGNOSIS — Z11.59 ENCOUNTER FOR HEPATITIS C SCREENING TEST FOR LOW RISK PATIENT: ICD-10-CM

## 2020-12-01 DIAGNOSIS — Z80.42 FAMILY HISTORY OF PROSTATE CANCER IN FATHER: ICD-10-CM

## 2020-12-01 DIAGNOSIS — Z12.5 SCREENING FOR PROSTATE CANCER: Primary | ICD-10-CM

## 2020-12-01 PROBLEM — Z85.46 HISTORY OF PROSTATE CANCER: Status: ACTIVE | Noted: 2020-12-01

## 2020-12-01 PROBLEM — R10.13 EPIGASTRIC DISCOMFORT: Status: RESOLVED | Noted: 2020-08-13 | Resolved: 2020-12-01

## 2020-12-01 PROBLEM — M79.672 CHRONIC FOOT PAIN, LEFT: Status: RESOLVED | Noted: 2020-08-13 | Resolved: 2020-12-01

## 2020-12-01 PROBLEM — G89.29 CHRONIC FOOT PAIN, LEFT: Status: RESOLVED | Noted: 2020-08-13 | Resolved: 2020-12-01

## 2020-12-01 PROCEDURE — 99212 OFFICE O/P EST SF 10 MIN: CPT | Performed by: FAMILY MEDICINE

## 2020-12-01 NOTE — PROGRESS NOTES
Date of Encounter: 2020  Patient: John Roy,  1975    Subjective   History of Presenting Illness  Chief complaint: Prostate screening    Patient comes in asking for prostate cancer screening.  His father has prostate cancer diagnosed at age 60.  Patient unsure of staging.  Patient denies urinary frequency, urgency, straining with urination, nocturia, he does have a small amount of dribbling which he believes is negligible.  Denies constipation.    Minimal to hepatitis C screening.    Chronic left foot pain has improved.    Up-to-date on influenza vaccination.    Review of Systems:  Negative for fever, congestion, cough, shortness of breath    The following portions of the patient's history were reviewed and updated as appropriate: allergies, current medications, past family history, past medical history, past social history, past surgical history and problem list.    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  No Known Allergies  Social History     Tobacco Use   • Smoking status: Former Smoker     Types: Cigarettes     Quit date:      Years since quittin.9   • Smokeless tobacco: Former User   Substance Use Topics   • Alcohol use: Yes     Alcohol/week: 5.0 standard drinks     Types: 5 Standard drinks or equivalent per week   • Drug use: Not on file          Objective   Physical Exam  Vitals:    20 1611   BP: 114/70   Pulse: 66   Temp: 97.3 °F (36.3 °C)   TempSrc: Temporal   SpO2: 98%   Weight: 74.3 kg (163 lb 12.8 oz)   Height:  "175.3 cm (69\")     Body mass index is 24.19 kg/m².    Constitutional: NAD.  Eyes: Normal conjunctiva.  Cardiovascular: RRR. No murmurs. No LE edema b/l. Radial pulses 2+ bilaterally.  Pulmonary: CTA b/l. Good effort.  Integumentary: No rashes or wounds on face or upper extremities.  Lymphatic: No anterior cervical lymphadenopathy.  Endocrine: No thyromegaly or palpable thyroid nodules.  Psychiatric: Normal affect. Normal thought content.     Assessment/Plan   Assessment and Plan  Pleasant 45-year-old male with hyperlipidemia, family history of prostate cancer in father, mild RAMSES, colon polyps, who presents for the following:     Diagnoses and all orders for this visit:    1. Screening for prostate cancer (Primary): He does not have any significant symptoms of prostate disease, okay to do PSAs every other year until age 56 which time we will do annual PSA screening.  Discussed symptoms of enlarging prostate and prostate cancer.  He is to come in for an exam if he has any of these.  -     PSA Screen  2. Family history of prostate cancer in father    3. Encounter for hepatitis C screening test for low risk patient  -     HCV Antibody Rfx To Qnt PCR    Follow-up for annual physical in 1 year    Venkatesh Davis MD  Family Medicine  O: [phone]  C: [phone]    Disclaimer: Parts of this note were dictated by speech recognition. Minor errors in transcription may be present. Please call if questions.     "

## 2020-12-02 ENCOUNTER — TELEPHONE (OUTPATIENT)
Dept: INTERNAL MEDICINE | Facility: CLINIC | Age: 45
End: 2020-12-02

## 2020-12-02 LAB
HCV AB S/CO SERPL IA: <0.1 S/CO RATIO (ref 0–0.9)
HCV AB SERPL QL IA: NORMAL
PSA SERPL-MCNC: 1.2 NG/ML (ref 0–4)

## 2020-12-02 NOTE — TELEPHONE ENCOUNTER
----- Message from Venkatesh Davis MD sent at 12/2/2020  8:47 AM EST -----  Please call the patient about their results with the following discussion points:    Prostate test normal. Hepatitis C screen negative.  We will repeat PSA levels every 2 years unless he has prostate symptoms as previously discussed

## 2020-12-02 NOTE — TELEPHONE ENCOUNTER
JENIFERVM for pt re: lab results. Pt was advised of all normal lab results, and advised to have PSA rechecked in 2 years.

## 2021-10-04 ENCOUNTER — OFFICE VISIT (OUTPATIENT)
Dept: INTERNAL MEDICINE | Facility: CLINIC | Age: 46
End: 2021-10-04

## 2021-10-04 VITALS
HEIGHT: 69 IN | DIASTOLIC BLOOD PRESSURE: 62 MMHG | OXYGEN SATURATION: 97 % | SYSTOLIC BLOOD PRESSURE: 116 MMHG | HEART RATE: 63 BPM | WEIGHT: 166 LBS | TEMPERATURE: 97.3 F | BODY MASS INDEX: 24.59 KG/M2

## 2021-10-04 DIAGNOSIS — R06.83 SNORING: ICD-10-CM

## 2021-10-04 DIAGNOSIS — Z00.00 ANNUAL PHYSICAL EXAM: Primary | ICD-10-CM

## 2021-10-04 DIAGNOSIS — Z23 NEED FOR IMMUNIZATION AGAINST INFLUENZA: ICD-10-CM

## 2021-10-04 DIAGNOSIS — E78.2 MIXED HYPERLIPIDEMIA: ICD-10-CM

## 2021-10-04 DIAGNOSIS — R41.840 ATTENTION DEFICIT: ICD-10-CM

## 2021-10-04 PROCEDURE — 99396 PREV VISIT EST AGE 40-64: CPT | Performed by: FAMILY MEDICINE

## 2021-10-04 NOTE — PROGRESS NOTES
Date of Encounter: 10/04/2021  Patient: John Roy,  1975    Subjective   History of Presenting Illness  Chief complaint: Annual physical    Attention deficit: Problems with concentration, focus, attention both at work and at home.  This is causing problems with his family.  Associated symptoms include disrupted sleep, somewhat melancholy mood but denies anhedonia, change in appetite, depression. Strong family history of attention deficit disorders in mother, cousins.  Has never been on medications for this before.     Chronic left foot pain: Problem has resolved with regular stretches and appropriate footwear.     History of colon polyps discovered on colonoscopy, gets every 5 years.     , 3 children.  Sexually active.  Prior 15-pack-year smoker quit at age 29.    3 alcoholic drinks every other day.  Uses cannabis recreationally.  Exercises by running, used to run daily but now only once weekly.  Healthy diet.  Has a living will. Due for influenza vaccination.    Review of Systems:  Negative for fever, congestion, chest pain upon exertion, shortness of breath, vision changes, vomiting, dysuria, lymphadenopathy, muscle weakness, numbness, mood changes, rashes.    The following portions of the patient's history were reviewed and updated as appropriate: allergies, current medications, past family history, past medical history, past social history, past surgical history and problem list.    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  No Known Allergies  Social History     Tobacco Use   • Smoking status: Former Smoker     Packs/day: 0.00     Years: 5.00     Pack years: 0.00     Types: Cigarettes     Quit date:      Years since quittin.7   • Smokeless tobacco: Former User   Substance Use Topics   • Alcohol use: Yes     Alcohol/week: 0.0 standard drinks   • Drug use: No          Objective   Physical Exam  Vitals:    10/04/21 1302   BP: 116/62   Pulse: 63   Temp: 97.3 °F (36.3 °C)   TempSrc: Temporal   SpO2: 97%   Weight: 75.3 kg (166 lb)   Height: 175.3 cm (69\")     Body mass index is 24.51 kg/m².    Constitutional: NAD.  Eyes: EOMI. PERRLA. Normal conjunctiva.  Ear, nose, mouth, throat: Normal external ear canals and TMs bilaterally.  Cardiovascular: RRR. No murmurs. No LE edema b/l. Radial pulses 2+ bilaterally.  Pulmonary: CTA b/l. Good effort.  Integumentary: No rashes or wounds on face or upper extremities.  Lymphatic: No anterior cervical lymphadenopathy.  Endocrine: No thyromegaly or palpable thyroid nodules.  Psychiatric: Blunted affect.  Congruent mood.  Normal thought content.  No SI or HI  Gastrointestinal: Nondistended. No hepatosplenomegaly. No focal tenderness to palpation. Normal bowel sounds.       Assessment/Plan   Assessment and Plan  Pleasant 46-year-old male with hyperlipidemia, colon polyps, who presents for the following:    Diagnoses and all orders for this visit:    1. Annual physical exam (Primary): We discussed preventative care including age and patient-appropriate immunizations and cancer screening. We also discussed the importance of exercise and a healthy diet. This is their annual preventative exam.    2. Attention deficit: Only moderate suspicion for depression as he does not have many other symptoms consistent with this diagnosis.  Currently impairing work and home life.  If his blood work is " unremarkable for other causes we did discuss risks and benefits of a trial of stimulant medication.  If appropriate we will call in and have him follow-up in a few months.  -     CBC & Differential  -     Vitamin B12    3. Mild RAMSES not requiring CPAP    4. Mixed hyperlipidemia  -     Lipid Panel  -     Comprehensive Metabolic Panel  -     Thyroid Panel With TSH    5. Need for immunization against influenza  -     FluLaval/Fluarix >6 Months ([id number])    Return to office in 1 year for annual physical or earlier as needed.    Venkatesh Davis MD  Family Medicine  O: [phone]  C: [phone]    Disclaimer: Parts of this note were dictated by speech recognition. Minor errors in transcription may be present. Please call if questions.

## 2021-10-05 LAB
ALBUMIN SERPL-MCNC: 5.1 G/DL (ref 4–5)
ALBUMIN/GLOB SERPL: 2 {RATIO} (ref 1.2–2.2)
ALP SERPL-CCNC: 70 IU/L (ref 44–121)
ALT SERPL-CCNC: 22 IU/L (ref 0–44)
AST SERPL-CCNC: 28 IU/L (ref 0–40)
BASOPHILS # BLD AUTO: 0 X10E3/UL (ref 0–0.2)
BASOPHILS NFR BLD AUTO: 0 %
BILIRUB SERPL-MCNC: 0.7 MG/DL (ref 0–1.2)
BUN SERPL-MCNC: 15 MG/DL (ref 6–24)
BUN/CREAT SERPL: 14 (ref 9–20)
CALCIUM SERPL-MCNC: 9.8 MG/DL (ref 8.7–10.2)
CHLORIDE SERPL-SCNC: 99 MMOL/L (ref 96–106)
CHOLEST SERPL-MCNC: 204 MG/DL (ref 100–199)
CO2 SERPL-SCNC: 24 MMOL/L (ref 20–29)
CREAT SERPL-MCNC: 1.1 MG/DL (ref 0.76–1.27)
EOSINOPHIL # BLD AUTO: 0.1 X10E3/UL (ref 0–0.4)
EOSINOPHIL NFR BLD AUTO: 1 %
ERYTHROCYTE [DISTWIDTH] IN BLOOD BY AUTOMATED COUNT: 12.6 % (ref 11.6–15.4)
FT4I SERPL CALC-MCNC: 1.7 (ref 1.2–4.9)
GLOBULIN SER CALC-MCNC: 2.5 G/DL (ref 1.5–4.5)
GLUCOSE SERPL-MCNC: 90 MG/DL (ref 65–99)
HCT VFR BLD AUTO: 47.2 % (ref 37.5–51)
HDLC SERPL-MCNC: 58 MG/DL
HGB BLD-MCNC: 15.8 G/DL (ref 13–17.7)
IMM GRANULOCYTES # BLD AUTO: 0 X10E3/UL (ref 0–0.1)
IMM GRANULOCYTES NFR BLD AUTO: 0 %
LDLC SERPL CALC-MCNC: 125 MG/DL (ref 0–99)
LYMPHOCYTES # BLD AUTO: 2.4 X10E3/UL (ref 0.7–3.1)
LYMPHOCYTES NFR BLD AUTO: 30 %
MCH RBC QN AUTO: 30 PG (ref 26.6–33)
MCHC RBC AUTO-ENTMCNC: 33.5 G/DL (ref 31.5–35.7)
MCV RBC AUTO: 90 FL (ref 79–97)
MONOCYTES # BLD AUTO: 0.5 X10E3/UL (ref 0.1–0.9)
MONOCYTES NFR BLD AUTO: 7 %
NEUTROPHILS # BLD AUTO: 5.1 X10E3/UL (ref 1.4–7)
NEUTROPHILS NFR BLD AUTO: 62 %
PLATELET # BLD AUTO: 236 X10E3/UL (ref 150–450)
POTASSIUM SERPL-SCNC: 4.2 MMOL/L (ref 3.5–5.2)
PROT SERPL-MCNC: 7.6 G/DL (ref 6–8.5)
RBC # BLD AUTO: 5.26 X10E6/UL (ref 4.14–5.8)
SODIUM SERPL-SCNC: 140 MMOL/L (ref 134–144)
T3RU NFR SERPL: 25 % (ref 24–39)
T4 SERPL-MCNC: 6.8 UG/DL (ref 4.5–12)
TRIGL SERPL-MCNC: 118 MG/DL (ref 0–149)
TSH SERPL DL<=0.005 MIU/L-ACNC: 2.08 UIU/ML (ref 0.45–4.5)
VIT B12 SERPL-MCNC: 729 PG/ML (ref 232–1245)
VLDLC SERPL CALC-MCNC: 21 MG/DL (ref 5–40)
WBC # BLD AUTO: 8.1 X10E3/UL (ref 3.4–10.8)

## 2021-10-06 PROCEDURE — 90686 IIV4 VACC NO PRSV 0.5 ML IM: CPT | Performed by: FAMILY MEDICINE

## 2021-10-06 PROCEDURE — 90471 IMMUNIZATION ADMIN: CPT | Performed by: FAMILY MEDICINE

## 2021-10-06 NOTE — PROGRESS NOTES
Overall your bloodwork looks good. LDL cholesterol remains mildly elevated but you remain at low risk for heart disease and I do not recommend any cholesterol medication at this time. We will continue to follow annually.

## 2022-07-18 ENCOUNTER — TELEPHONE (OUTPATIENT)
Dept: INTERNAL MEDICINE | Facility: CLINIC | Age: 47
End: 2022-07-18

## 2022-07-18 NOTE — TELEPHONE ENCOUNTER
Symptoms started yesterday per wife (on HIPAA form) as patient was not well enough to speak to me.  Was seen at [facility] yesterday and they were going to try and to the infusion but they were not able to get him scheduled.  Please advise.

## 2022-07-18 NOTE — TELEPHONE ENCOUNTER
"Gave patient information below and Dr. Davis's recommendations.  Pt expressed understanding.     Stay hydrated  Make sure you walk regularly to prevent formation of blood clots  Take vitamin D daily  Use acetaminophen regularly for discomfort  Sleep on your stomach if you can  Take frequent deep breaths - google \"perez coughing\" to clear mucus as well  If you have access to a pulse oximeter, I recommend going to the hospital if your oxygen drops below 90% for a prolonged period of time.  If your shortness of breath is worsening, or you are having chest pain, leg swelling, or other symptoms that are worsening, please get evaluated in urgent care or emergency room     I hope you feel better soon. I recommend 10 days from the start of your symptoms for initial quarantine, 14 days is the safer duration if you are able.  "

## 2022-07-18 NOTE — TELEPHONE ENCOUNTER
Please let him know that unfortunately he does not have any conditions that qualify him for antibody infusions or Paxlovid.  Furthermore, I do not think I could even get him antibody infusions if he did qualify because of the current shortage and strict criteria for giving these.

## 2022-07-18 NOTE — TELEPHONE ENCOUNTER
Caller: John Roy    Relationship to patient: Self    Best call back number: [phone]    Patient is needing: PATIENT HAS TESTED POSITIVE FOR COVID AGAIN.  HE IS HAVING HIGH FEVER, SORE THROAT, BODY ACHE, CHILLS, TIGHTNESS IN CHEST.  HE WOULD LIKE TO KNOW IF HE CAN GET AN INFUSION.  PLEASE CALL AND ADVISE.

## 2022-07-26 ENCOUNTER — OFFICE VISIT (OUTPATIENT)
Dept: INTERNAL MEDICINE | Facility: CLINIC | Age: 47
End: 2022-07-26

## 2022-07-26 VITALS
DIASTOLIC BLOOD PRESSURE: 72 MMHG | SYSTOLIC BLOOD PRESSURE: 122 MMHG | HEART RATE: 83 BPM | OXYGEN SATURATION: 98 % | TEMPERATURE: 96.9 F

## 2022-07-26 DIAGNOSIS — M71.30 SYNOVIAL CYST: ICD-10-CM

## 2022-07-26 DIAGNOSIS — L72.3 SEBACEOUS CYST: ICD-10-CM

## 2022-07-26 DIAGNOSIS — F41.9 ANXIETY: Primary | ICD-10-CM

## 2022-07-26 PROCEDURE — 99214 OFFICE O/P EST MOD 30 MIN: CPT | Performed by: FAMILY MEDICINE

## 2022-07-26 RX ORDER — ESCITALOPRAM OXALATE 10 MG/1
TABLET ORAL
Qty: 60 TABLET | Refills: 1 | Status: SHIPPED | OUTPATIENT
Start: 2022-07-26 | End: 2022-10-06 | Stop reason: SDUPTHER

## 2022-07-26 RX ORDER — AMOXICILLIN AND CLAVULANATE POTASSIUM 875; 125 MG/1; MG/1
1 TABLET, FILM COATED ORAL EVERY 12 HOURS
COMMUNITY
Start: 2022-07-17 | End: 2022-07-27

## 2022-07-26 NOTE — PROGRESS NOTES
"Date of Encounter: 2022  Patient: John Roy,  1975    Subjective   History of Presenting Illness  Chief complaint: COVID-19, anxiety    Patient diagnosed with COVID-19 twice within the past month.  Recently treated with Augmentin for \"tightness\" in his chest after his most recent diagnosis 2022.  Chest x-ray was normal.  He feels that he is recovered almost completely.  He is still taking Augmentin with no side effects.    Symptoms that he initially describes as anxiety, also concern for ADHD-like symptoms.  He describes recent preoccupation with worrying about things like mortality especially with the recent death of his father.  This sometimes causes disrupted sleep.  He has problems concentrating on tasks.  No SI or HI.  He finds that he is more irritable.  He sought out a therapist and has been seeing them regularly which he thinks is helpful.  His symptoms have been bad enough or he is interested in the medication.  He has never been on a medication before.    He has a small cyst on his interphalangeal joint of the right thumb that is not particularly painful.  He has a larger cyst on the back of his neck that does not cause any discomfort.    Review of Systems:  Negative for chest pain, loss of taste and smell, headache    The following portions of the patient's history were reviewed and updated as appropriate: allergies, current medications, past family history, past medical history, past social history, past surgical history and problem list.    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 CPAP   • Mixed hyperlipidemia   • Colon polyps   • Family history of prostate cancer in father   • Sebaceous cyst   • Synovial cyst   • Anxiety     Past Medical History:   Diagnosis Date   • Chronic foot pain, left 2020   • Colon polyp 2009   • Epigastric discomfort 2020   • Viral gastritis 2017     Past Surgical History:   Procedure Laterality Date   • " COLONOSCOPY     • VASECTOMY  2016     Family History   Problem Relation Age of Onset   • Cancer Mother    • Prostate cancer Father    • Cancer Father         Prostate Cancer   • COPD Father        Current Outpatient Medications:   •  amoxicillin-clavulanate (AUGMENTIN) 875-125 MG per tablet, Take 1 tablet by mouth Every 12 (Twelve) Hours., Disp: , Rfl:   •  escitalopram (LEXAPRO) 10 MG tablet, Take 1 tab PO QD for mood, Disp: 60 tablet, Rfl: 1  No Known Allergies  Social History     Tobacco Use   • Smoking status: Former Smoker     Packs/day: 0.00     Years: 5.00     Pack years: 0.00     Types: Cigarettes     Quit date:      Years since quittin.5   • Smokeless tobacco: Former User   Substance Use Topics   • Alcohol use: Yes     Alcohol/week: 0.0 standard drinks   • Drug use: No          Objective   Physical Exam  Vitals:    22 0810   BP: 122/72   Pulse: 83   Temp: 96.9 °F (36.1 °C)   TempSrc: Infrared   SpO2: 98%     There is no height or weight on file to calculate BMI.    Constitutional: NAD.  Cardiovascular: RRR. No murmurs. No LE edema b/l. Radial pulses 2+ bilaterally.  Pulmonary: CTA b/l. Good effort.  Integumentary: 3 mm synovial cyst on the interphalangeal joint of the right thumb that is not inflamed.  There is a 1.5 cm sebaceous cyst on the left posterior neck that is mobile, soft, nontender, not fluctuant  Psychiatric: Normal affect.  Mood described as anxious.  Normal thought content.  Normal speech pattern.  Normal eye contact.  Good insight and judgment.  No SI or HI.     Assessment & Plan   Assessment and Plan  Pleasant 47-year-old male with hyperlipidemia, colon polyps, who presents for the following:    Diagnoses and all orders for this visit:    1. Anxiety (Primary): Discussed risks and benefits of medications.  He is concerned about attention deficit like symptoms but I think this is a new marker more related to his anxiety than anything else.  We will start by addressing this with  an SSRI, discussed risks and benefits of Lexapro.  We will follow-up with him at his physical in about 2 months.  Discussed reasons to return earlier.  Encouraged him to continue therapy and regular exercise.  -     escitalopram (LEXAPRO) 10 MG tablet; Take 1 tab PO QD for mood  Dispense: 60 tablet; Refill: 1    2. Synovial cyst: Discussed the etiology, prognosis and management of this.  Recommend no intervention at this time.    3. Sebaceous cyst: This is large enough where he could have it removed electively if interested, and has no other concerning features, recommend watchful waiting and avoiding irritating the lesion    Venkatesh Davis MD  Family Medicine  O: [phone]  C: [phone]    Disclaimer: Parts of this note were dictated by speech recognition. Minor errors in transcription may be present. Please call if questions.

## 2022-09-09 ENCOUNTER — OFFICE VISIT (OUTPATIENT)
Dept: INTERNAL MEDICINE | Facility: CLINIC | Age: 47
End: 2022-09-09

## 2022-09-09 VITALS
OXYGEN SATURATION: 99 % | WEIGHT: 170 LBS | HEART RATE: 82 BPM | SYSTOLIC BLOOD PRESSURE: 122 MMHG | BODY MASS INDEX: 25.18 KG/M2 | HEIGHT: 69 IN | TEMPERATURE: 98.6 F | DIASTOLIC BLOOD PRESSURE: 81 MMHG

## 2022-09-09 DIAGNOSIS — S16.1XXA STRAIN OF NECK MUSCLE, INITIAL ENCOUNTER: ICD-10-CM

## 2022-09-09 DIAGNOSIS — J06.9 UPPER RESPIRATORY TRACT INFECTION, UNSPECIFIED TYPE: Primary | ICD-10-CM

## 2022-09-09 PROCEDURE — 99214 OFFICE O/P EST MOD 30 MIN: CPT | Performed by: NURSE PRACTITIONER

## 2022-09-09 RX ORDER — AZITHROMYCIN 250 MG/1
TABLET, FILM COATED ORAL
Qty: 6 TABLET | Refills: 0 | Status: SHIPPED | OUTPATIENT
Start: 2022-09-09 | End: 2022-10-06

## 2022-09-09 NOTE — PROGRESS NOTES
Date of Encounter: 2022  Patient: John Roy,  1975    Subjective     Chief complaint: Cough, arm discomfort    HPI   Patient here today to discuss ongoing cough.  Patient was recently seen in urgent care and started on prednisone and given benzonatate.  Patient states that he took the medications as directed.  States that his cough is not improving.  Patient states that he has a lot of mucus with the cough and it has lasted for several weeks.    Patient would also like to discuss about some arm discomfort he has been having.  Patient states that he thought it might of been associated with his Lexapro as the discomfort started at the same time in July.  Patient states that the Lexapro is working well for his mood.      He denies any injury to the left arm.  States that sometimes he will have a pain that causes numbness and tingling in the left arm.  Denies any shortness of breath, trouble breathing or chest discomfort.        Review of Systems:  Negative for fever, congestion, chest pain upon exertion, shortness of breath, vision changes, vomiting, dysuria, lymphadenopathy, muscle weakness, numbness, mood changes, rashes.    The following portions of the patient's history were reviewed and updated as appropriate: allergies, current medications, past family history, past medical history, past social history, past surgical history and problem list.    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 CPAP   • Mixed hyperlipidemia   • Colon polyps   • Family history of prostate cancer in father   • Sebaceous cyst   • Synovial cyst   • Anxiety     Past Medical History:   Diagnosis Date   • Chronic foot pain, left 2020   • Colon polyp 2009   • Epigastric discomfort 2020   • Viral gastritis 2017     Past Surgical History:   Procedure Laterality Date   • COLONOSCOPY     • VASECTOMY       Family History   Problem Relation Age of Onset   • Cancer Mother    •  "Prostate cancer Father    • Cancer Father         Prostate Cancer   • COPD Father        Current Outpatient Medications:   •  benzonatate (TESSALON) 200 MG capsule, Take 1 capsule by mouth 3 (Three) Times a Day As Needed for Cough., Disp: 20 capsule, Rfl: 0  •  escitalopram (LEXAPRO) 10 MG tablet, Take 1 tab PO QD for mood, Disp: 60 tablet, Rfl: 1  •  azithromycin (Zithromax Z-Gideon) 250 MG tablet, Take 2 tablets by mouth on day 1, then 1 tablet daily on days 2-5, Disp: 6 tablet, Rfl: 0  •  pseudoephedrine (Sudafed) 30 MG tablet, Take 2 tablets by mouth 3 (Three) Times a Day., Disp: 30 tablet, Rfl: 0  No Known Allergies  Social History     Tobacco Use   • Smoking status: Former Smoker     Packs/day: 0.00     Years: 5.00     Pack years: 0.00     Types: Cigarettes     Quit date:      Years since quittin.7   • Smokeless tobacco: Former User   Substance Use Topics   • Alcohol use: Yes     Alcohol/week: 0.0 standard drinks   • Drug use: No          Objective   Physical Exam   Vitals:    22 1424   BP: 122/81   Pulse: 82   Temp: 98.6 °F (37 °C)   TempSrc: Temporal   SpO2: 99%   Weight: 77.1 kg (170 lb)   Height: 175.3 cm (69\")     Body mass index is 25.1 kg/m².    Constitutional: NAD.  Ear, nose, mouth, throat: No tonsillar exudates positive erythema.   Normal nasal mucosa. Normal external ear canals and TMs bilaterally.  Cardiovascular: RRR. No murmurs. No LE edema b/l. Radial pulses 2+ bilaterally.  Pulmonary: CTA b/l. Good effort.  Musculoskeletal: No pain with palpation over left side of neck or arm.  Full range of motion.         Assessment & Plan   Assessment and Plan  Pleasant 47-year-old male with mixed hyperlipidemia, colon polyps, anxiety and others here today to discuss the following:    Diagnoses and all orders for this visit:    1. Upper respiratory tract infection, unspecified type (Primary)  -     azithromycin (Zithromax Z-Gideon) 250 MG tablet; Take 2 tablets by mouth on day 1, then 1 tablet daily " on days 2-5  Dispense: 6 tablet; Refill: 0    2. Strain of neck muscle, initial encounter  -     Ambulatory Referral to Physical Therapy Evaluate and treat         Discussed with patient about neck steps in care.  Discussed about possible pinched nerve in neck.  We will start with physical therapy.  If no improvement will look into imaging.  Discussed about cough and side effects of medications.  Answered some questions from the patient.  Patient verbalized understanding of and agreed to plan of care.    Return if symptoms worsen or fail to improve.     Katie Root DNP, FNP-C  [facility]  O: [phone]      Please note that portions of this note were completed with a voice recognition program. Please call if questions.

## 2022-10-06 ENCOUNTER — OFFICE VISIT (OUTPATIENT)
Dept: INTERNAL MEDICINE | Facility: CLINIC | Age: 47
End: 2022-10-06

## 2022-10-06 VITALS
OXYGEN SATURATION: 98 % | HEIGHT: 68 IN | TEMPERATURE: 96.8 F | SYSTOLIC BLOOD PRESSURE: 110 MMHG | WEIGHT: 170.4 LBS | DIASTOLIC BLOOD PRESSURE: 72 MMHG | HEART RATE: 64 BPM | BODY MASS INDEX: 25.82 KG/M2

## 2022-10-06 DIAGNOSIS — Z12.5 SCREENING FOR MALIGNANT NEOPLASM OF PROSTATE: ICD-10-CM

## 2022-10-06 DIAGNOSIS — Z80.42 FAMILY HISTORY OF PROSTATE CANCER IN FATHER: ICD-10-CM

## 2022-10-06 DIAGNOSIS — Z23 NEED FOR IMMUNIZATION AGAINST INFLUENZA: ICD-10-CM

## 2022-10-06 DIAGNOSIS — Z23 NEED FOR COVID-19 VACCINE: ICD-10-CM

## 2022-10-06 DIAGNOSIS — E78.2 MIXED HYPERLIPIDEMIA: ICD-10-CM

## 2022-10-06 DIAGNOSIS — M54.12 LEFT CERVICAL RADICULOPATHY: ICD-10-CM

## 2022-10-06 DIAGNOSIS — F41.9 ANXIETY: ICD-10-CM

## 2022-10-06 DIAGNOSIS — Z00.00 ANNUAL PHYSICAL EXAM: Primary | ICD-10-CM

## 2022-10-06 DIAGNOSIS — R06.83 SNORING: ICD-10-CM

## 2022-10-06 DIAGNOSIS — Z13.9 SCREENING FOR UNSPECIFIED CONDITION: ICD-10-CM

## 2022-10-06 PROCEDURE — 99396 PREV VISIT EST AGE 40-64: CPT | Performed by: FAMILY MEDICINE

## 2022-10-06 PROCEDURE — 0124A PR ADM SARSCOV2 30MCG/0.3ML BST: CPT | Performed by: FAMILY MEDICINE

## 2022-10-06 PROCEDURE — 90471 IMMUNIZATION ADMIN: CPT | Performed by: FAMILY MEDICINE

## 2022-10-06 PROCEDURE — 90686 IIV4 VACC NO PRSV 0.5 ML IM: CPT | Performed by: FAMILY MEDICINE

## 2022-10-06 PROCEDURE — 91312 COVID-19 (PFIZER) BIVALENT BOOSTER 12+YRS: CPT | Performed by: FAMILY MEDICINE

## 2022-10-06 RX ORDER — ESCITALOPRAM OXALATE 20 MG/1
TABLET ORAL
Qty: 90 TABLET | Refills: 3 | Status: SHIPPED | OUTPATIENT
Start: 2022-10-06 | End: 2023-02-06

## 2022-10-06 NOTE — PROGRESS NOTES
Date of Encounter: 10/06/2022  Patient: John Roy,  1975    Subjective   History of Presenting Illness  Chief complaint: Annual physical    Anxiety has improved significantly with escitalopram with no side effects after the initial period.  He has noticed better energy, improved mood, increased concentration.  He is interested in increasing the dose to the next level if that is okay.    Hyperlipidemia not currently on medications.    Family history of prostate cancer in father, he does not have any urinary symptoms.    History of mild RAMSES not on CPAP.  He is sleeping well.    Left cervical radiculopathy doing physical therapy, symptoms are improving overall and his pain is a 1-3/10 at worst    , 3 children ages 8, 13, 12. Sexually active.  Prior 15-pack-year smoker quit at age 29. <3 alcoholic drinks every other day. Uses cannabis recreationally rarely. Not exercising currently.  Healthy diet. Colon cancer screening  with 5-year follow-up due to polyps. Works at [employer]. Has a living will.  Due for COVID-19 and influenza vaccine.    Review of Systems:  Negative for fever, congestion, chest pain upon exertion, shortness of breath, vision changes, vomiting, dysuria, lymphadenopathy, muscle weakness, numbness, mood changes, rashes.    The following portions of the patient's history were reviewed and updated as appropriate: allergies, current medications, past family history, past medical history, past social history, past surgical history and problem list.    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 CPAP   • Mixed hyperlipidemia   • Colon polyps   • Family history of prostate cancer in father   • Sebaceous cyst   • Synovial cyst   • Anxiety   • Left cervical radiculopathy     Past Medical History:   Diagnosis Date   • Chronic foot pain, left 2020   • Colon polyp 2009   • Epigastric discomfort 2020   • Viral gastritis 2017     Past Surgical  "History:   Procedure Laterality Date   • COLONOSCOPY     • VASECTOMY       Family History   Problem Relation Age of Onset   • Cancer Mother    • Prostate cancer Father    • Cancer Father         Prostate Cancer   • COPD Father        Current Outpatient Medications:   •  escitalopram (LEXAPRO) 20 MG tablet, Take 1 tab PO QD for mood, Disp: 90 tablet, Rfl: 3  No Known Allergies  Social History     Tobacco Use   • Smoking status: Former Smoker     Packs/day: 0.00     Years: 5.00     Pack years: 0.00     Types: Cigarettes     Quit date:      Years since quittin.7   • Smokeless tobacco: Former User   Substance Use Topics   • Alcohol use: Yes     Alcohol/week: 0.0 standard drinks   • Drug use: No          Objective   Physical Exam  Vitals:    10/06/22 1244   BP: 110/72   BP Location: Left arm   Patient Position: Sitting   Cuff Size: Adult   Pulse: 64   Temp: 96.8 °F (36 °C)   TempSrc: Temporal   SpO2: 98%   Weight: 77.3 kg (170 lb 6.4 oz)   Height: 172.7 cm (68\")     Body mass index is 25.91 kg/m².    Constitutional: NAD.  Eyes: EOMI. PERRLA. Normal conjunctiva.  Ear, nose, mouth, throat: No tonsillar exudates or erythema.   Normal nasal mucosa. Normal external ear canals and TMs bilaterally.  Cardiovascular: RRR. No murmurs. No LE edema b/l. Radial pulses 2+ bilaterally.  Pulmonary: CTA b/l. Good effort.  Integumentary: No rashes or wounds on face or upper extremities.  Lymphatic: No anterior cervical lymphadenopathy.  Endocrine: No thyromegaly or palpable thyroid nodules.  Psychiatric: Normal affect. Normal thought content.  Gastrointestinal: Nondistended. No hepatosplenomegaly. No focal tenderness to palpation. Normal bowel sounds.       Assessment & Plan   Assessment and Plan  Very pleasant 47-year-old male with anxiety, hyperlipidemia, family history of prostate cancer in father, who presents for the following:    Diagnoses and all orders for this visit:    1. Annual physical exam (Primary): We discussed " preventative care including age and patient-appropriate immunizations and cancer screening. We also discussed the importance of exercise and a healthy diet. This is their annual preventative exam.    2. Anxiety: Improved with citalopram.  Okay to increase dose to 20 mg, discussed side effects risk with this dose increase.  -     escitalopram (LEXAPRO) 20 MG tablet; Take 1 tab PO QD for mood  Dispense: 90 tablet; Refill: 3    3. Mixed hyperlipidemia: Based on recent lipids does not qualify for statin  -     Lipid Panel  -     Comprehensive Metabolic Panel  -     CBC & Differential  -     Thyroid Panel With TSH    4. Family history of prostate cancer in father: No current symptoms, will screen due to family history    5. Mild RAMSES not requiring CPAP    6. Left cervical radiculopathy: Improving, discussed reasons to return for further follow-up    7. Screening for unspecified condition  -     Hemoglobin A1c  -     Urinalysis With Microscopic - Urine, Clean Catch    8. Screening for malignant neoplasm of prostate  -     PSA Screen    9. Need for immunization against influenza  -     FluLaval/Fluzone >6 mos ([id number])    10. Need for COVID-19 vaccine  -     COVID-19 Bivalent Booster (Pfizer) 12+yrs    Return to office in 1 year for annual physical or earlier as needed.    Venkatesh Davis MD  Family Medicine  O: [phone]  C: [phone]    Disclaimer: Parts of this note were dictated by speech recognition. Minor errors in transcription may be present. Please call if questions.

## 2022-10-07 LAB
ALBUMIN SERPL-MCNC: 4.7 G/DL (ref 4–5)
ALBUMIN/GLOB SERPL: 1.9 {RATIO} (ref 1.2–2.2)
ALP SERPL-CCNC: 72 IU/L (ref 44–121)
ALT SERPL-CCNC: 23 IU/L (ref 0–44)
APPEARANCE UR: CLEAR
AST SERPL-CCNC: 25 IU/L (ref 0–40)
BACTERIA #/AREA URNS HPF: NORMAL /[HPF]
BASOPHILS # BLD AUTO: 0 X10E3/UL (ref 0–0.2)
BASOPHILS NFR BLD AUTO: 0 %
BILIRUB SERPL-MCNC: 0.7 MG/DL (ref 0–1.2)
BILIRUB UR QL STRIP: NEGATIVE
BUN SERPL-MCNC: 13 MG/DL (ref 6–24)
BUN/CREAT SERPL: 11 (ref 9–20)
CALCIUM SERPL-MCNC: 9.6 MG/DL (ref 8.7–10.2)
CASTS URNS QL MICRO: NORMAL /LPF
CHLORIDE SERPL-SCNC: 101 MMOL/L (ref 96–106)
CHOLEST SERPL-MCNC: 231 MG/DL (ref 100–199)
CO2 SERPL-SCNC: 23 MMOL/L (ref 20–29)
COLOR UR: YELLOW
CREAT SERPL-MCNC: 1.16 MG/DL (ref 0.76–1.27)
EGFRCR SERPLBLD CKD-EPI 2021: 79 ML/MIN/1.73
EOSINOPHIL # BLD AUTO: 0.1 X10E3/UL (ref 0–0.4)
EOSINOPHIL NFR BLD AUTO: 1 %
EPI CELLS #/AREA URNS HPF: NORMAL /HPF (ref 0–10)
ERYTHROCYTE [DISTWIDTH] IN BLOOD BY AUTOMATED COUNT: 12.7 % (ref 11.6–15.4)
FT4I SERPL CALC-MCNC: 2 (ref 1.2–4.9)
GLOBULIN SER CALC-MCNC: 2.5 G/DL (ref 1.5–4.5)
GLUCOSE SERPL-MCNC: 108 MG/DL (ref 70–99)
GLUCOSE UR QL STRIP: NEGATIVE
HBA1C MFR BLD: 5.4 % (ref 4.8–5.6)
HCT VFR BLD AUTO: 44 % (ref 37.5–51)
HDLC SERPL-MCNC: 55 MG/DL
HGB BLD-MCNC: 14.9 G/DL (ref 13–17.7)
HGB UR QL STRIP: NEGATIVE
IMM GRANULOCYTES # BLD AUTO: 0 X10E3/UL (ref 0–0.1)
IMM GRANULOCYTES NFR BLD AUTO: 0 %
KETONES UR QL STRIP: NEGATIVE
LDLC SERPL CALC-MCNC: 145 MG/DL (ref 0–99)
LEUKOCYTE ESTERASE UR QL STRIP: NEGATIVE
LYMPHOCYTES # BLD AUTO: 2.2 X10E3/UL (ref 0.7–3.1)
LYMPHOCYTES NFR BLD AUTO: 30 %
MCH RBC QN AUTO: 29.7 PG (ref 26.6–33)
MCHC RBC AUTO-ENTMCNC: 33.9 G/DL (ref 31.5–35.7)
MCV RBC AUTO: 88 FL (ref 79–97)
MICRO URNS: NORMAL
MICRO URNS: NORMAL
MONOCYTES # BLD AUTO: 0.5 X10E3/UL (ref 0.1–0.9)
MONOCYTES NFR BLD AUTO: 6 %
NEUTROPHILS # BLD AUTO: 4.7 X10E3/UL (ref 1.4–7)
NEUTROPHILS NFR BLD AUTO: 63 %
NITRITE UR QL STRIP: NEGATIVE
PH UR STRIP: 7 [PH] (ref 5–7.5)
PLATELET # BLD AUTO: 228 X10E3/UL (ref 150–450)
POTASSIUM SERPL-SCNC: 3.7 MMOL/L (ref 3.5–5.2)
PROT SERPL-MCNC: 7.2 G/DL (ref 6–8.5)
PROT UR QL STRIP: NEGATIVE
PSA SERPL-MCNC: 1.4 NG/ML (ref 0–4)
RBC # BLD AUTO: 5.02 X10E6/UL (ref 4.14–5.8)
RBC #/AREA URNS HPF: NORMAL /HPF (ref 0–2)
SODIUM SERPL-SCNC: 142 MMOL/L (ref 134–144)
SP GR UR STRIP: 1.02 (ref 1–1.03)
T3RU NFR SERPL: 27 % (ref 24–39)
T4 SERPL-MCNC: 7.3 UG/DL (ref 4.5–12)
TRIGL SERPL-MCNC: 174 MG/DL (ref 0–149)
TSH SERPL DL<=0.005 MIU/L-ACNC: 1.31 UIU/ML (ref 0.45–4.5)
UROBILINOGEN UR STRIP-MCNC: 0.2 MG/DL (ref 0.2–1)
VLDLC SERPL CALC-MCNC: 31 MG/DL (ref 5–40)
WBC # BLD AUTO: 7.4 X10E3/UL (ref 3.4–10.8)
WBC #/AREA URNS HPF: NORMAL /HPF (ref 0–5)

## 2022-10-07 NOTE — PROGRESS NOTES
Your cholesterol has worsened since the last time we checked it but this should improve with the return to regular exercise.  I have no other concerns with the rest of your blood work.

## 2023-02-06 ENCOUNTER — OFFICE VISIT (OUTPATIENT)
Dept: INTERNAL MEDICINE | Facility: CLINIC | Age: 48
End: 2023-02-06
Payer: COMMERCIAL

## 2023-02-06 VITALS
HEIGHT: 68 IN | DIASTOLIC BLOOD PRESSURE: 82 MMHG | WEIGHT: 175.4 LBS | OXYGEN SATURATION: 100 % | SYSTOLIC BLOOD PRESSURE: 118 MMHG | HEART RATE: 69 BPM | TEMPERATURE: 97.8 F | BODY MASS INDEX: 26.58 KG/M2

## 2023-02-06 DIAGNOSIS — R41.840 ATTENTION DEFICIT: ICD-10-CM

## 2023-02-06 DIAGNOSIS — F41.9 ANXIETY: Primary | ICD-10-CM

## 2023-02-06 DIAGNOSIS — L72.3 INFLAMED SEBACEOUS CYST: ICD-10-CM

## 2023-02-06 PROCEDURE — 99214 OFFICE O/P EST MOD 30 MIN: CPT | Performed by: FAMILY MEDICINE

## 2023-02-06 RX ORDER — BUPROPION HYDROCHLORIDE 150 MG/1
TABLET, EXTENDED RELEASE ORAL
Qty: 60 TABLET | Refills: 1 | Status: SHIPPED | OUTPATIENT
Start: 2023-02-06

## 2023-02-06 RX ORDER — PREDNISONE 20 MG/1
20 TABLET ORAL DAILY
Qty: 5 TABLET | Refills: 0 | Status: SHIPPED | OUTPATIENT
Start: 2023-02-06 | End: 2023-02-11

## 2023-02-06 NOTE — PROGRESS NOTES
Date of Encounter: 2023  Patient: John Roy,  1975    Subjective   History of Presenting Illness  Chief complaint: Anxiety and attention problems    Patient did find benefit from escitalopram during the first few months, but started to have flattening of his mood along with other side effects and so ended up stopping it.  He is still having anxiety, although not as bad as previously, but he is noticing a lot of attention issues and irritability.  He has problems concentrating on his responsibilities at work as well as at home. he has had problems with this when he was younger.  He has tried Concerta in the past which did help the symptoms but did make him a little bit more anxious and elevated.  He did try Adderall 10 mg which he found more favorable.  He has never been on other medicines for attention before.  He is not having any symptoms of depression.    Sebaceous cyst on the posterior left neck is slightly larger, more uncomfortable than previously.  Interested in getting this removed.  There is no drainage or discharge from this.    The following portions of the patient's history were reviewed and updated as appropriate: allergies, current medications, past family history, past medical history, past social history, past surgical history and problem list.    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 CPAP   • Mixed hyperlipidemia   • Colon polyps   • Family history of prostate cancer in father   • Sebaceous cyst   • Synovial cyst   • Anxiety   • Left cervical radiculopathy     Past Medical History:   Diagnosis Date   • Chronic foot pain, left 2020   • Colon polyp 2009   • Epigastric discomfort 2020   • Viral gastritis 2017     Past Surgical History:   Procedure Laterality Date   • COLONOSCOPY     • VASECTOMY       Family History   Problem Relation Age of Onset   • Cancer Mother    • Prostate cancer Father    • Cancer Father         Prostate  "Cancer   • COPD Father    • Anxiety disorder Father        Current Outpatient Medications:   •  buPROPion SR (WELLBUTRIN SR) 150 MG 12 hr tablet, Take 1 tab PO BID, Disp: 60 tablet, Rfl: 1  •  escitalopram (LEXAPRO) 20 MG tablet, Take 1 tab PO QD for mood, Disp: 90 tablet, Rfl: 3  No Known Allergies  Social History     Tobacco Use   • Smoking status: Former     Packs/day: 0.00     Years: 5.00     Pack years: 0.00     Types: Cigarettes     Quit date: 2001     Years since quittin.1   • Smokeless tobacco: Former   Substance Use Topics   • Alcohol use: Yes   • Drug use: No          Objective   Physical Exam  Vitals:    23 0836   BP: 118/82   BP Location: Left arm   Patient Position: Sitting   Pulse: 69   Temp: 97.8 °F (36.6 °C)   TempSrc: Infrared   SpO2: 100%   Weight: 79.6 kg (175 lb 6.4 oz)   Height: 172.7 cm (67.99\")     Body mass index is 26.68 kg/m².    Constitutional: NAD.  Integumentary: 1.5 cm mobile, firm sebaceous cyst on left posterior neck without drainage or erythema.  It is mildly tender to palpation.  Psychiatric: Mood described as \"good\".  Normal speech pattern.  Normal affect. Normal thought content.  Good insight and judgment.     Assessment & Plan   Assessment and Plan  Very pleasant 47-year-old male with anxiety, hyperlipidemia, family history of prostate cancer in father, who presents for the following:     Diagnoses and all orders for this visit:    1. Anxiety (Primary)  -     buPROPion SR (WELLBUTRIN SR) 150 MG 12 hr tablet; Take 1 tab PO BID  Dispense: 60 tablet; Refill: 1  2. Attention deficit  -     buPROPion SR (WELLBUTRIN SR) 150 MG 12 hr tablet; Take 1 tab PO BID  Dispense: 60 tablet; Refill: 1    Overlapping symptoms of anxiety and attention deficit.  Because of his age and concern over long-term cardiovascular risks, I recommend trying something like bupropion instead of a stimulant initially to see if that provides both anxiolysis and benefits concentration.  If he did not " find this effective okay with switching to Adderall once he sends me a message and we will change follow-up to 3 months with UDS and contract at that time. I reviewed the risks and benefits of bupropion and stimulants at his age.    3. Inflamed sebaceous cyst: Course of prednisone for inflamed sebaceous cyst, discussed reasons to return for further evaluation. we will send to general surgery for elective removal due to increasing problems with this.  -     Ambulatory Referral to General Surgery  -     predniSONE (DELTASONE) 20 MG tablet; Take 1 tablet by mouth Daily for 5 days.  Dispense: 5 tablet; Refill: 0    Venkatesh Davis MD  Family Medicine  O: [phone]    Disclaimer: Parts of this note were dictated by speech recognition. Minor errors in transcription may be present. Please call if questions.

## 2023-02-16 ENCOUNTER — OFFICE VISIT (OUTPATIENT)
Dept: SURGERY | Facility: CLINIC | Age: 48
End: 2023-02-16
Payer: COMMERCIAL

## 2023-02-16 VITALS — HEIGHT: 69 IN | WEIGHT: 174.8 LBS | BODY MASS INDEX: 25.89 KG/M2

## 2023-02-16 DIAGNOSIS — L72.3 SEBACEOUS CYST: Primary | ICD-10-CM

## 2023-02-16 PROCEDURE — 88304 TISSUE EXAM BY PATHOLOGIST: CPT | Performed by: SURGERY

## 2023-02-16 PROCEDURE — 11422 EXC H-F-NK-SP B9+MARG 1.1-2: CPT | Performed by: SURGERY

## 2023-02-16 PROCEDURE — 99202 OFFICE O/P NEW SF 15 MIN: CPT | Performed by: SURGERY

## 2023-02-17 ENCOUNTER — TELEPHONE (OUTPATIENT)
Dept: SURGERY | Facility: CLINIC | Age: 48
End: 2023-02-17
Payer: COMMERCIAL

## 2023-02-17 NOTE — TELEPHONE ENCOUNTER
Patient had a cyst removed in the office yesterday-he called to ask when he should remove his bandage. Pt states he has white pieces of tape which I informed him are steri-strips and that they will fall off on their own. Pt reports some pain at the incision site especially when trying to sleep on his back. Advised that he can take OTC tylenol or ibuprofen if needed. Pt states he did have some blood oozing from the incision last night. I reassured him that this is not uncommon and that if it does start oozing again he can hold firm pressure for 15 minutes to help it slow down.

## 2023-02-20 LAB
LAB AP CASE REPORT: NORMAL
PATH REPORT.FINAL DX SPEC: NORMAL
PATH REPORT.GROSS SPEC: NORMAL

## 2023-03-14 NOTE — PROGRESS NOTES
Subjective   John Roy is a 47 y.o. male who presents to the office in surgical consultation from Venkatesh Davis MD for a sebaceous cyst of the posterior neck.    History of Present Illness     The patient has a subcutaneous mass of the left posterior neck that has previously been diagnosed as a sebaceous cyst.  It was small and asymptomatic and no treatment was advised.  Recently it has gotten larger and is increasingly symptomatic.  He has pain when pressure is applied.  There has not been any erythema nor drainage.    Review of Systems   Constitutional: Negative for fatigue and fever.   Respiratory: Negative for chest tightness and shortness of breath.    Cardiovascular: Negative for chest pain and palpitations.   Gastrointestinal: Negative for abdominal pain, blood in stool, constipation, diarrhea, nausea and vomiting.     Past Medical History:   Diagnosis Date   • Chronic foot pain, left 2020   • Colon polyp 2009   • Epigastric discomfort 2020   • Viral gastritis 2017     Past Surgical History:   Procedure Laterality Date   • COLONOSCOPY     • VASECTOMY       Family History   Problem Relation Age of Onset   • Cancer Mother    • Early death Mother         Age 54   • Learning disabilities Mother    • Prostate cancer Father    • Cancer Father         Prostate Cancer   • COPD Father    • Anxiety disorder Father    • Colon cancer Father    • Colon cancer Paternal Uncle    • Depression Maternal Grandmother    • Colon cancer Paternal Grandmother      Social History     Socioeconomic History   • Marital status:    Tobacco Use   • Smoking status: Former     Packs/day: 0.00     Years: 5.00     Pack years: 0.00     Types: Cigarettes     Quit date: 2001     Years since quittin.2   • Smokeless tobacco: Former   Vaping Use   • Vaping Use: Never used   Substance and Sexual Activity   • Alcohol use: Yes     Alcohol/week: 2.0 standard drinks     Types: 2 Cans of beer per week   •  Drug use: No   • Sexual activity: Yes     Partners: Female     Birth control/protection: Surgical, None       Objective   Physical Exam  Constitutional:       Appearance: Normal appearance. He is well-developed. He is not toxic-appearing.   Eyes:      General: No scleral icterus.  Pulmonary:      Effort: Pulmonary effort is normal. No respiratory distress.   Skin:     General: Skin is warm and dry.      Comments: There is a 2 cm sebaceous cyst of the posterior neck that is raised and somewhat tender to palpation.  There is no erythema nor fluctuance.   Neurological:      Mental Status: He is alert and oriented to person, place, and time.   Psychiatric:         Behavior: Behavior normal.         Thought Content: Thought content normal.         Judgment: Judgment normal.     Procedure  The area of the sebaceous cyst on the left posterior neck was prepped and draped in standard surgical fashion.  It was infiltrated with 1% lidocaine without epinephrine.  A small elliptical incision was made around the sebaceous cyst with a scalpel carried through the skin into the subcutaneous tissue.  The cyst was completely excised sharply with a scalpel and passed off for pathology.  It was 1.8 cm in size.  The skin was closed with a 4-0 Vicryl in a subcuticular fashion followed by Mastisol and Steri-Strips.  A dressing was applied.  The patient tolerated the procedure well.    Assessment & Plan     1.  Sebaceous cyst: The patient has a sebaceous cyst of the left posterior neck that has slowly gotten larger and increasingly symptomatic.  It is at risk of becoming infected.  An excision of the sebaceous cyst was performed in the office.  Local care was discussed with him.  He will follow-up on an as-needed basis.

## 2023-03-26 ENCOUNTER — PATIENT MESSAGE (OUTPATIENT)
Dept: INTERNAL MEDICINE | Facility: CLINIC | Age: 48
End: 2023-03-26
Payer: COMMERCIAL

## 2023-03-26 DIAGNOSIS — R41.840 ATTENTION DEFICIT: Primary | ICD-10-CM

## 2023-03-27 RX ORDER — DEXTROAMPHETAMINE SACCHARATE, AMPHETAMINE ASPARTATE, DEXTROAMPHETAMINE SULFATE AND AMPHETAMINE SULFATE 2.5; 2.5; 2.5; 2.5 MG/1; MG/1; MG/1; MG/1
10 TABLET ORAL 2 TIMES DAILY
Qty: 60 TABLET | Refills: 0 | Status: SHIPPED | OUTPATIENT
Start: 2023-03-27

## 2023-05-08 ENCOUNTER — OFFICE VISIT (OUTPATIENT)
Dept: INTERNAL MEDICINE | Facility: CLINIC | Age: 48
End: 2023-05-08
Payer: COMMERCIAL

## 2023-05-08 VITALS
HEIGHT: 69 IN | WEIGHT: 169.6 LBS | TEMPERATURE: 97.7 F | OXYGEN SATURATION: 100 % | DIASTOLIC BLOOD PRESSURE: 74 MMHG | HEART RATE: 88 BPM | BODY MASS INDEX: 25.12 KG/M2 | SYSTOLIC BLOOD PRESSURE: 104 MMHG

## 2023-05-08 DIAGNOSIS — R41.840 ATTENTION DEFICIT: Primary | ICD-10-CM

## 2023-05-08 PROCEDURE — 99212 OFFICE O/P EST SF 10 MIN: CPT | Performed by: FAMILY MEDICINE

## 2023-05-08 NOTE — PROGRESS NOTES
Date of Encounter: 2023  Patient: John Roy,  1975    Subjective   History of Presenting Illness  Chief complaint: Follow-up attention deficit    Patient recently started on Adderall 10 mg twice daily after failing Wellbutrin and escitalopram for his attention deficit and mood symptoms.  He reports significant improvement of his symptoms including improvement in concentration, multitasking, irritability, depression.  He did notice insomnia with taking it twice a day and so has not also been taking 1x10 mg tablet in the morning, has been skipping weekends.  Denies headaches, palpitations, anxiety, anger, chest pain.     The following portions of the patient's history were reviewed and updated as appropriate: allergies, current medications, past family history, past medical history, past social history, past surgical history and problem list.    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 CPAP   • Mixed hyperlipidemia   • Colon polyps   • Family history of prostate cancer in father   • Sebaceous cyst   • Synovial cyst   • Anxiety   • Left cervical radiculopathy   • Attention deficit     Past Medical History:   Diagnosis Date   • Chronic foot pain, left 2020   • Colon polyp 2009   • Epigastric discomfort 2020   • H/O removal of neck cyst 2023   • Viral gastritis 2017     Past Surgical History:   Procedure Laterality Date   • COLONOSCOPY     • VASECTOMY  2016     Family History   Problem Relation Age of Onset   • Cancer Mother    • Early death Mother         Age 54   • Learning disabilities Mother    • Prostate cancer Father    • Cancer Father         Prostate Cancer   • COPD Father    • Anxiety disorder Father    • Colon cancer Father    • Colon cancer Paternal Uncle    • Depression Maternal Grandmother    • Colon cancer Paternal Grandmother        Current Outpatient Medications:   •  amphetamine-dextroamphetamine (Adderall) 10 MG tablet, Take 1  "tablet by mouth 2 (Two) Times a Day., Disp: 60 tablet, Rfl: 0  No Known Allergies  Social History     Tobacco Use   • Smoking status: Former     Packs/day: 0.00     Years: 5.00     Pack years: 0.00     Types: Cigarettes     Quit date: 2001     Years since quittin.3   • Smokeless tobacco: Former   Vaping Use   • Vaping Use: Never used   Substance Use Topics   • Alcohol use: Yes     Alcohol/week: 2.0 standard drinks     Types: 2 Cans of beer per week   • Drug use: No          Objective   Physical Exam  Vitals:    23 0818   BP: 104/74   BP Location: Left arm   Pulse: 88   Temp: 97.7 °F (36.5 °C)   SpO2: 100%   Weight: 76.9 kg (169 lb 9.6 oz)   Height: 175.3 cm (69.02\")     Body mass index is 25.03 kg/m².    Constitutional: NAD.  Cardiovascular: RRR. No murmurs. No LE edema b/l. Radial pulses 2+ left.  Psychiatric: Normal affect. Normal thought content.     Assessment & Plan   Assessment and Plan  Very pleasant 47-year-old male with anxiety, attention deficit, hyperlipidemia, family history of prostate cancer in father, who presents for the following:    Diagnoses and all orders for this visit:    1. Attention deficit (Primary)  -     Urine Drug Screen - Urine, Clean Catch    He is doing well symptomatically on current regimen after failing a trial of SSRIs and bupropion.  I discussed the risks and benefits of treatment of attention deficit in adults, risks of long-term stimulants.  His cardiovascular risk is low and he is tolerating this medication well.  Timmy is appropriate.  UDS today.  We will have him back for his annual physical in October otherwise keep him on an every 6-month schedule if his feels continue to be regular.    Venkatesh Davis MD  Family Medicine  O: [phone]    Disclaimer: Parts of this note were dictated by speech recognition. Minor errors in transcription may be present. Please call if questions.     "

## 2023-05-09 LAB
AMPHETAMINES UR QL SCN: POSITIVE NG/ML
BARBITURATES UR QL SCN: NEGATIVE NG/ML
BENZODIAZ UR QL SCN: NEGATIVE NG/ML
BZE UR QL SCN: NEGATIVE NG/ML
CANNABINOIDS UR QL SCN: NEGATIVE NG/ML
CREAT UR-MCNC: 193.9 MG/DL (ref 20–300)
LABORATORY COMMENT REPORT: ABNORMAL
METHADONE UR QL SCN: NEGATIVE NG/ML
OPIATES UR QL SCN: NEGATIVE NG/ML
OXYCODONE+OXYMORPHONE UR QL SCN: NEGATIVE NG/ML
PCP UR QL: NEGATIVE NG/ML
PH UR: 5.7 [PH] (ref 4.5–8.9)
PROPOXYPH UR QL SCN: NEGATIVE NG/ML

## 2023-05-18 DIAGNOSIS — R41.840 ATTENTION DEFICIT: ICD-10-CM

## 2023-05-18 RX ORDER — DEXTROAMPHETAMINE SACCHARATE, AMPHETAMINE ASPARTATE, DEXTROAMPHETAMINE SULFATE AND AMPHETAMINE SULFATE 2.5; 2.5; 2.5; 2.5 MG/1; MG/1; MG/1; MG/1
10 TABLET ORAL 2 TIMES DAILY
Qty: 180 TABLET | Refills: 0 | Status: SHIPPED | OUTPATIENT
Start: 2023-05-18

## 2023-05-18 NOTE — TELEPHONE ENCOUNTER
Rx Refill Note  Requested Prescriptions     Pending Prescriptions Disp Refills   • amphetamine-dextroamphetamine (Adderall) 10 MG tablet 60 tablet 0     Sig: Take 1 tablet by mouth 2 (Two) Times a Day.      Last office visit with prescribing clinician: 5/8/2023   Last telemedicine visit with prescribing clinician: 5/8/2023   Next office visit with prescribing clinician: 10/6/2023          Urine Drug Screen - Urine, Clean Catch (05/08/2023 08:37)                   Would you like a call back once the refill request has been completed: [] Yes [] No    If the office needs to give you a call back, can they leave a voicemail: [] Yes [] No    Lindsay Santacruz MA  05/18/23, 13:52 EDT

## 2023-07-28 ENCOUNTER — TELEPHONE (OUTPATIENT)
Dept: SURGERY | Facility: CLINIC | Age: 48
End: 2023-07-28
Payer: COMMERCIAL

## 2023-07-28 NOTE — TELEPHONE ENCOUNTER
Spoke with pt informed him that we have his intake papers for his cscope, his forms are on Dr. Lechuga's desk. Let pt know we are awaiting orders to get him scheduled. Pt voiced understanding.

## 2023-08-03 ENCOUNTER — PREP FOR SURGERY (OUTPATIENT)
Dept: OTHER | Facility: HOSPITAL | Age: 48
End: 2023-08-03
Payer: COMMERCIAL

## 2023-08-03 DIAGNOSIS — Z80.0 FAMILY HISTORY OF COLON CANCER: Primary | ICD-10-CM

## 2023-08-31 PROBLEM — F98.8 ATTENTION DEFICIT DISORDER: Status: ACTIVE | Noted: 2023-02-06

## 2023-08-31 PROBLEM — F41.1 GENERALIZED ANXIETY DISORDER: Status: ACTIVE | Noted: 2022-07-26

## 2023-09-22 ENCOUNTER — PATIENT MESSAGE (OUTPATIENT)
Dept: INTERNAL MEDICINE | Facility: CLINIC | Age: 48
End: 2023-09-22

## 2023-09-22 ENCOUNTER — OFFICE VISIT (OUTPATIENT)
Dept: INTERNAL MEDICINE | Facility: CLINIC | Age: 48
End: 2023-09-22
Payer: COMMERCIAL

## 2023-09-22 VITALS
TEMPERATURE: 97.7 F | SYSTOLIC BLOOD PRESSURE: 118 MMHG | OXYGEN SATURATION: 99 % | BODY MASS INDEX: 24.29 KG/M2 | DIASTOLIC BLOOD PRESSURE: 64 MMHG | WEIGHT: 164 LBS | HEART RATE: 89 BPM | HEIGHT: 69 IN

## 2023-09-22 DIAGNOSIS — R41.840 ATTENTION DEFICIT: ICD-10-CM

## 2023-09-22 DIAGNOSIS — Z23 NEED FOR IMMUNIZATION AGAINST INFLUENZA: ICD-10-CM

## 2023-09-22 DIAGNOSIS — R41.840 ATTENTION DEFICIT: Primary | ICD-10-CM

## 2023-09-22 RX ORDER — METHYLPHENIDATE HYDROCHLORIDE 27 MG/1
27 TABLET ORAL EVERY MORNING
Qty: 30 TABLET | Refills: 0 | Status: SHIPPED | OUTPATIENT
Start: 2023-09-22 | End: 2023-09-25 | Stop reason: SDUPTHER

## 2023-09-22 NOTE — PROGRESS NOTES
Date of Encounter: 2023  Patient: John Roy,  1975    Subjective   History of Presenting Illness  Chief complaint: Follow-up attention deficit    Attention deficit, patient did notice benefit with attention with Adderall but has been having insomnia, increased anxiety, irritability when he misses the medication, and palpitations.  He does have a history of trying Concerta about 1 year ago which he did find effective.  He feels his mood is doing well.  He is amenable to influenza vaccination today.    The following portions of the patient's history were reviewed and updated as appropriate: allergies, current medications, past family history, past medical history, past social history, past surgical history and problem list.    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    Mixed hyperlipidemia    Colon polyps    Family history of prostate cancer in father    Sebaceous cyst    Synovial cyst    Generalized anxiety disorder    Left cervical radiculopathy    Attention deficit disorder    Family history of colon cancer     Past Medical History:   Diagnosis Date    ADHD (attention deficit hyperactivity disorder) 23    Anxiety 24    Chronic foot pain, left 2020    Colon polyp 2009    Epigastric discomfort 2020    H/O removal of neck cyst 2023    Viral gastritis 2017     Past Surgical History:   Procedure Laterality Date    COLONOSCOPY      VASECTOMY  2016     Family History   Problem Relation Age of Onset    Cancer Mother     Early death Mother         Age 54    Learning disabilities Mother     Prostate cancer Father     Cancer Father         Prostate Cancer    COPD Father     Anxiety disorder Father     Colon cancer Father     Colon cancer Paternal Uncle     Depression Maternal Grandmother     Colon cancer Paternal Grandmother        Current Outpatient Medications:     methylphenidate (Concerta) 27 MG CR tablet, Take 1 tablet by mouth  "Every Morning, Disp: 30 tablet, Rfl: 0  No Known Allergies  Social History     Tobacco Use    Smoking status: Former     Packs/day: 0.00     Years: 5.00     Pack years: 0.00     Types: Cigarettes     Quit date: 2001     Years since quittin.7    Smokeless tobacco: Former   Vaping Use    Vaping Use: Never used   Substance Use Topics    Alcohol use: Yes     Alcohol/week: 2.0 standard drinks     Types: 2 Cans of beer per week    Drug use: No          Objective   Physical Exam  Vitals:    23 0855   BP: 118/64   BP Location: Left arm   Patient Position: Sitting   Cuff Size: Large Adult   Pulse: 89   Temp: 97.7 °F (36.5 °C)   TempSrc: Infrared   SpO2: 99%   Weight: 74.4 kg (164 lb)   Height: 175.3 cm (69\")     Body mass index is 24.22 kg/m².    Constitutional: NAD.  Psychiatric: Normal affect. Normal thought content.  Normal speech pattern.  Good eye contact.  Good insight and judgment.     Assessment & Plan   Assessment and Plan  Very pleasant 48-year-old male with anxiety, attention deficit, hyperlipidemia, family history of prostate cancer in father, who presents for the following:     Diagnoses and all orders for this visit:    1. Attention deficit (Primary): Since he is not doing well on amphetamine class stimulants recommend rotation to methylphenidate which would be in line with his previous benefit with Concerta.  It appears he had tried a slightly higher dose, we will start a little bit lower at 27, discussed typical side effects and reasons to consider dose adjustment.  We will follow-up at his physical shortly.  If irritability or anxiety persistent may adjunct with low-dose SSRI.  -     methylphenidate (Concerta) 27 MG CR tablet; Take 1 tablet by mouth Every Morning  Dispense: 30 tablet; Refill: 0    2. Need for immunization against influenza  -     Fluzone (or Fluarix & Flulaval for VFC) >6 Mos ([id number])    Venkatesh Davis MD  Family Medicine  O: [phone]    Disclaimer: Parts of this note " were dictated by speech recognition. Minor errors in transcription may be present. Please call if questions.

## 2023-09-25 RX ORDER — METHYLPHENIDATE HYDROCHLORIDE 27 MG/1
27 TABLET ORAL EVERY MORNING
Qty: 30 TABLET | Refills: 0 | Status: SHIPPED | OUTPATIENT
Start: 2023-09-25

## 2023-09-25 NOTE — TELEPHONE ENCOUNTER
From: John Roy  To: Venkatesh Davis  Sent: 9/22/2023 5:38 PM EDT  Subject: Concerta     Went to [facility] to pickup prescription, they are out of stock and said its been on back order for over a year. They said they will not have it. Is there another option?

## 2023-09-29 ENCOUNTER — HOSPITAL ENCOUNTER (OUTPATIENT)
Facility: HOSPITAL | Age: 48
Setting detail: HOSPITAL OUTPATIENT SURGERY
Discharge: HOME | End: 2023-09-29
Attending: SURGERY | Admitting: SURGERY
Payer: COMMERCIAL

## 2023-09-29 ENCOUNTER — ANESTHESIA EVENT (OUTPATIENT)
Dept: GASTROENTEROLOGY | Facility: HOSPITAL | Age: 48
End: 2023-09-29
Payer: COMMERCIAL

## 2023-09-29 ENCOUNTER — ANESTHESIA (OUTPATIENT)
Dept: GASTROENTEROLOGY | Facility: HOSPITAL | Age: 48
End: 2023-09-29
Payer: COMMERCIAL

## 2023-09-29 VITALS
OXYGEN SATURATION: 98 % | HEIGHT: 68 IN | BODY MASS INDEX: 24.25 KG/M2 | SYSTOLIC BLOOD PRESSURE: 110 MMHG | WEIGHT: 160 LBS | DIASTOLIC BLOOD PRESSURE: 88 MMHG | RESPIRATION RATE: 20 BRPM | HEART RATE: 64 BPM

## 2023-09-29 DIAGNOSIS — Z80.0 FAMILY HISTORY OF COLON CANCER: ICD-10-CM

## 2023-09-29 PROCEDURE — 88305 TISSUE EXAM BY PATHOLOGIST: CPT | Performed by: SURGERY

## 2023-09-29 PROCEDURE — 45385 COLONOSCOPY W/LESION REMOVAL: CPT | Performed by: SURGERY

## 2023-09-29 PROCEDURE — 25010000002 PROPOFOL 10 MG/ML EMULSION: Performed by: REGISTERED NURSE

## 2023-09-29 PROCEDURE — S0260 H&P FOR SURGERY: HCPCS | Performed by: SURGERY

## 2023-09-29 RX ORDER — SODIUM CHLORIDE, SODIUM LACTATE, POTASSIUM CHLORIDE, CALCIUM CHLORIDE 600; 310; 30; 20 MG/100ML; MG/100ML; MG/100ML; MG/100ML
1000 INJECTION, SOLUTION INTRAVENOUS CONTINUOUS
Status: DISCONTINUED | OUTPATIENT
Start: 2023-09-29 | End: 2023-09-29 | Stop reason: HOSPADM

## 2023-09-29 RX ORDER — SODIUM CHLORIDE 0.9 % (FLUSH) 0.9 %
10 SYRINGE (ML) INJECTION AS NEEDED
Status: DISCONTINUED | OUTPATIENT
Start: 2023-09-29 | End: 2023-09-29 | Stop reason: HOSPADM

## 2023-09-29 RX ORDER — PROPOFOL 10 MG/ML
VIAL (ML) INTRAVENOUS AS NEEDED
Status: DISCONTINUED | OUTPATIENT
Start: 2023-09-29 | End: 2023-09-29 | Stop reason: SURG

## 2023-09-29 RX ORDER — ONDANSETRON 2 MG/ML
4 INJECTION INTRAMUSCULAR; INTRAVENOUS ONCE AS NEEDED
Status: DISCONTINUED | OUTPATIENT
Start: 2023-09-29 | End: 2023-09-29 | Stop reason: HOSPADM

## 2023-09-29 RX ORDER — LIDOCAINE HYDROCHLORIDE 20 MG/ML
INJECTION, SOLUTION INFILTRATION; PERINEURAL AS NEEDED
Status: DISCONTINUED | OUTPATIENT
Start: 2023-09-29 | End: 2023-09-29 | Stop reason: SURG

## 2023-09-29 RX ADMIN — PROPOFOL 180 MCG/KG/MIN: 10 INJECTION, EMULSION INTRAVENOUS at 08:16

## 2023-09-29 RX ADMIN — PROPOFOL 100 MG: 10 INJECTION, EMULSION INTRAVENOUS at 08:15

## 2023-09-29 RX ADMIN — SODIUM CHLORIDE, POTASSIUM CHLORIDE, SODIUM LACTATE AND CALCIUM CHLORIDE 1000 ML: 600; 310; 30; 20 INJECTION, SOLUTION INTRAVENOUS at 07:46

## 2023-09-29 RX ADMIN — LIDOCAINE HYDROCHLORIDE 60 MG: 20 INJECTION, SOLUTION INFILTRATION; PERINEURAL at 08:15

## 2023-09-29 NOTE — ANESTHESIA POSTPROCEDURE EVALUATION
Patient: John Roy    Procedure Summary       Date: 09/29/23 Room / Location: [facility] ENDOSCOPY 8 / [facility] ENDOSCOPY    Anesthesia Start: 0811 Anesthesia Stop: 0837    Procedure: COLONOSCOPY to cecum with hot snare polypectomy Diagnosis:       Family history of colon cancer      (Family history of colon cancer [Z80.0])    Surgeons: John Lechuga Jr., MD Provider: Ayde Stiles MD    Anesthesia Type: MAC ASA Status: 2            Anesthesia Type: MAC    Vitals  Vitals Value Taken Time   /88 09/29/23 0856   Temp     Pulse 64 09/29/23 0856   Resp 20 09/29/23 0856   SpO2 98 % 09/29/23 0856           Post Anesthesia Care and Evaluation    No anesthesia care post op

## 2023-09-29 NOTE — H&P
[facility]   HISTORY AND PHYSICAL    Patient Name: John Roy  : 1975  MRN: [id number]  Primary Care Physician:  Venkatesh Davis MD  Date of admission: 2023    Subjective   Subjective     Chief Complaint: Family history of colon cancer    History of Present Illness    The patient is a pleasant 48-year-old male who has a family history of colon cancer in his father.  He has no significant GI symptoms.  He presents for screening colonoscopy high risk group.    Review of Systems   Constitutional:  Negative for fatigue and fever.   Respiratory:  Negative for chest tightness and shortness of breath.    Cardiovascular:  Negative for chest pain and palpitations.   Gastrointestinal:  Negative for abdominal pain, blood in stool, constipation, diarrhea, nausea and vomiting.      Personal History     Past Medical History:   Diagnosis Date    ADHD (attention deficit hyperactivity disorder) 23    Anxiety 23    Chronic foot pain, left 2020    Colon polyp 2009    Epigastric discomfort 2020    H/O removal of neck cyst 2023    Viral gastritis 2017       Past Surgical History:   Procedure Laterality Date    COLONOSCOPY      VASECTOMY         Family History: family history includes Anxiety disorder in his father; COPD in his father; Cancer in his father and mother; Colon cancer in his father, paternal grandmother, and paternal uncle; Depression in his maternal grandmother; Early death in his mother; Learning disabilities in his mother; Prostate cancer in his father. Otherwise pertinent FHx was reviewed and not pertinent to current issue.    Social History:  reports that he quit smoking about 22 years ago. His smoking use included cigarettes. He has quit using smokeless tobacco. He reports current alcohol use of about 2.0 standard drinks per week. He reports that he does not use drugs.    Home Medications:  methylphenidate    Allergies:  No Known Allergies    Objective     Objective     Vitals:   Heart Rate:  [71] 71  Resp:  [20] 20  BP: (132)/(83) 132/83    Physical Exam  Constitutional:       Appearance: Normal appearance. He is well-developed. He is not toxic-appearing.   Eyes:      General: No scleral icterus.  Pulmonary:      Effort: Pulmonary effort is normal. No respiratory distress.   Abdominal:      Palpations: Abdomen is soft.      Tenderness: There is no abdominal tenderness.   Skin:     General: Skin is warm and dry.   Neurological:      Mental Status: He is alert and oriented to person, place, and time.   Psychiatric:         Behavior: Behavior normal.         Thought Content: Thought content normal.         Judgment: Judgment normal.         Assessment & Plan   Assessment / Plan     Brief Patient Summary:  John Roy is a 48 y.o. male who has a family history of colon cancer.    Active Hospital Problems:  Active Hospital Problems    Diagnosis     **Family history of colon cancer      Plan: Colonoscopy.  The patient understands the indications, alternatives, risks, and benefits of the procedure and wishes to proceed.       John Lechuga Jr., MD

## 2023-09-29 NOTE — OP NOTE
Surgeon:     John Lechuga Jr., M.D.    Preoperative Diagnosis:     Family history of colon cancer    Postoperative Diagnosis:     Right colon polyp    Procedure Performed:     Colonoscopy with hot snare polypectomy    Indications:     The patient is a pleasant 48-year-old male with a family history of colon cancer.  He presents for screening colonoscopy high risk group.  The patient understands the indications, alternatives, risks, and benefits of the procedure and wishes to proceed.    Procedure:     The patient was identified, taken to the endoscopy suite, and place in the left side down decubitus procedure.  The patient underwent a MAC anesthesia and was appropriately monitored through the case by the anesthesia personnel.  A rectal exam was performed that was normal.  The colonoscope was introduced into the rectum and advanced very carefully to the cecum that was identified by the cecal strap, ileocecal valve, and the appendiceal orifice.  The scope was then slowly withdrawn with careful circumferential examination of the mucosa performed.  The bowel prep was good allowing adequate visualization of mucosal surfaces.  The scope was withdrawn.  The patient tolerated the procedure well and was transferred the recovery area in stable condition.     Findings:    There was a 6 mm in the right colon that had an adenomatous appearance.  It was sessile.  It was completely removed by hot snare polypectomy and retrieved.    Recommendations:     1.  Await pathology results from polypectomy.  2.  Repeat colonoscopy in 5 years.    John Lechuga Jr., M.D.

## 2023-09-29 NOTE — ANESTHESIA PREPROCEDURE EVALUATION
Anesthesia Evaluation     Patient summary reviewed and Nursing notes reviewed                Airway   Mallampati: I  No difficulty expected  Dental - normal exam     Pulmonary - negative pulmonary ROS and normal exam   Cardiovascular - normal exam    (+) hyperlipidemia      Neuro/Psych  (+) psychiatric history Anxiety and ADHD  GI/Hepatic/Renal/Endo - negative ROS   (-) no renal disease, diabetes, no thyroid disorder    Musculoskeletal (-) negative ROS    Abdominal    Substance History      OB/GYN          Other                      Anesthesia Plan    ASA 2     MAC       Anesthetic plan, risks, benefits, and alternatives have been provided, discussed and informed consent has been obtained with: patient.    CODE STATUS:

## 2023-10-02 LAB
LAB AP CASE REPORT: NORMAL
PATH REPORT.FINAL DX SPEC: NORMAL
PATH REPORT.GROSS SPEC: NORMAL

## 2023-10-06 ENCOUNTER — TELEPHONE (OUTPATIENT)
Dept: SURGERY | Facility: CLINIC | Age: 48
End: 2023-10-06
Payer: COMMERCIAL

## 2023-10-06 ENCOUNTER — OFFICE VISIT (OUTPATIENT)
Dept: INTERNAL MEDICINE | Facility: CLINIC | Age: 48
End: 2023-10-06
Payer: COMMERCIAL

## 2023-10-06 VITALS
OXYGEN SATURATION: 99 % | HEIGHT: 69 IN | DIASTOLIC BLOOD PRESSURE: 74 MMHG | WEIGHT: 167.4 LBS | SYSTOLIC BLOOD PRESSURE: 116 MMHG | TEMPERATURE: 98.3 F | HEART RATE: 75 BPM | BODY MASS INDEX: 24.79 KG/M2

## 2023-10-06 DIAGNOSIS — Z80.42 FAMILY HISTORY OF PROSTATE CANCER IN FATHER: ICD-10-CM

## 2023-10-06 DIAGNOSIS — Z12.5 SCREENING FOR MALIGNANT NEOPLASM OF PROSTATE: ICD-10-CM

## 2023-10-06 DIAGNOSIS — F41.1 GENERALIZED ANXIETY DISORDER: ICD-10-CM

## 2023-10-06 DIAGNOSIS — K63.5 POLYP OF COLON, UNSPECIFIED PART OF COLON, UNSPECIFIED TYPE: ICD-10-CM

## 2023-10-06 DIAGNOSIS — E78.2 MIXED HYPERLIPIDEMIA: ICD-10-CM

## 2023-10-06 DIAGNOSIS — Z80.0 FAMILY HISTORY OF COLON CANCER: ICD-10-CM

## 2023-10-06 DIAGNOSIS — F98.8 ATTENTION DEFICIT DISORDER, UNSPECIFIED HYPERACTIVITY PRESENCE: ICD-10-CM

## 2023-10-06 DIAGNOSIS — Z13.1 SCREENING FOR DIABETES MELLITUS: ICD-10-CM

## 2023-10-06 DIAGNOSIS — R06.83 SNORING: ICD-10-CM

## 2023-10-06 DIAGNOSIS — Z00.00 ANNUAL PHYSICAL EXAM: Primary | ICD-10-CM

## 2023-10-06 PROBLEM — M54.12 LEFT CERVICAL RADICULOPATHY: Status: RESOLVED | Noted: 2022-10-06 | Resolved: 2023-10-06

## 2023-10-06 PROCEDURE — 99396 PREV VISIT EST AGE 40-64: CPT | Performed by: FAMILY MEDICINE

## 2023-10-06 RX ORDER — DULOXETIN HYDROCHLORIDE 30 MG/1
CAPSULE, DELAYED RELEASE ORAL
Qty: 60 CAPSULE | Refills: 1 | Status: SHIPPED | OUTPATIENT
Start: 2023-10-06

## 2023-10-06 NOTE — PROGRESS NOTES
"Date of Encounter: 10/06/2023  Patient: John Roy,  1975    Subjective   History of Presenting Illness  Chief complaint: Annual physical     No acute concerns per    Patient tried Concerta and had headaches, irritability, with modest improvement in concentration.  \"Almost felt like I had a hangover\".  Main symptoms he is trying to address his fatigue especially in the evenings, irritability when trying to multitask especially at the end of the day after work, anxiety.  No major depressive symptoms.  Just to review he has tried escitalopram, bupropion, Adderall and Concerta.  He has minimized his alcohol.  He does not currently have time to exercise.  Gets about 7 to 8 hours of sleep per day.    He is trying to schedule a sleep study for follow-up of obstructive sleep apnea due to his fatigue    Hyperlipidemia with low 10-year ASCVD     Family history of prostate cancer in father, he has maybe a small amount of urinary dribbling but no other complaints     , 3 children ages 9, 14, 13. Sexually active.  Prior 15-pack-year smoker quit at age 29. 3 drinks or less per week. Uses cannabis recreationally rarely. Not exercising currently.  Overall healthy diet. Colon cancer screening  with 1 tubular adenoma and 5-year follow-up due to family history of colon cancer. Works at [employer]. Has a living will.  Plans to get COVID-19 vaccination.     Review of Systems:  Negative for fever, congestion, chest pain upon exertion, shortness of breath, vision changes, vomiting, dysuria, lymphadenopathy, muscle weakness, numbness, mood changes, rashes.    The following portions of the patient's history were reviewed and updated as appropriate: allergies, current medications, past family history, past medical history, past social history, past surgical history and problem list.    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    Mixed hyperlipidemia    Colon polyps    Family " history of prostate cancer in father    Sebaceous cyst    Synovial cyst    Generalized anxiety disorder    Attention deficit disorder    Family history of colon cancer     Past Medical History:   Diagnosis Date    ADHD (attention deficit hyperactivity disorder) 23    Anxiety 23    Chronic foot pain, left 2020    Colon polyp 2009    Epigastric discomfort 2020    H/O removal of neck cyst 2023    Left cervical radiculopathy 10/06/2022    Viral gastritis 2017     Past Surgical History:   Procedure Laterality Date    COLONOSCOPY      COLONOSCOPY N/A 2023    Procedure: COLONOSCOPY to cecum with hot snare polypectomy;  Surgeon: John Lechuga Jr., MD;  Location: [facility] ENDOSCOPY;  Service: General;  Laterality: N/A;  Pre: hx polyps, family hx colon cancer  Post: polyp    VASECTOMY  2016     Family History   Problem Relation Age of Onset    Cancer Mother     Early death Mother         Age 54    Learning disabilities Mother     Prostate cancer Father     Cancer Father         Prostate Cancer    COPD Father     Anxiety disorder Father     Colon cancer Father     Colon cancer Paternal Uncle     Depression Maternal Grandmother     Colon cancer Paternal Grandmother     Malig Hyperthermia Neg Hx        Current Outpatient Medications:     methylphenidate (Concerta) 27 MG CR tablet, Take 1 tablet by mouth Every Morning, Disp: 30 tablet, Rfl: 0    DULoxetine (CYMBALTA) 30 MG capsule, Take 1 tab PO QD for mood, Disp: 60 capsule, Rfl: 1  No Known Allergies  Social History     Tobacco Use    Smoking status: Former     Packs/day: 0.00     Years: 5.00     Pack years: 0.00     Types: Cigarettes     Quit date: 2001     Years since quittin.7    Smokeless tobacco: Former   Vaping Use    Vaping Use: Never used   Substance Use Topics    Alcohol use: Yes     Alcohol/week: 2.0 standard drinks     Types: 2 Cans of beer per week     Comment: socially    Drug use: No          Objective   Physical  "Exam  Vitals:    10/06/23 1238   BP: 116/74   BP Location: Left arm   Patient Position: Sitting   Cuff Size: Adult   Pulse: 75   Temp: 98.3 °F (36.8 °C)   TempSrc: Infrared   SpO2: 99%   Weight: 75.9 kg (167 lb 6.4 oz)   Height: 175.3 cm (69\")     Body mass index is 24.72 kg/m².    Constitutional: NAD.  Eyes: EOMI. PERRLA. Normal conjunctiva.  Ear, nose, mouth, throat: No tonsillar exudates or erythema.   Normal nasal mucosa. Normal external ear canals and TMs bilaterally.  Cardiovascular: RRR. No murmurs. No LE edema b/l. Radial pulses 2+ bilaterally.  Pulmonary: CTA b/l. Good effort.  Integumentary: No rashes or wounds on face or upper extremities.  Lymphatic: No anterior cervical lymphadenopathy.  Endocrine: No thyromegaly or palpable thyroid nodules.  Psychiatric: Normal affect. Normal thought content.  Gastrointestinal: Nondistended. No hepatosplenomegaly. No focal tenderness to palpation. Normal bowel sounds.     Assessment & Plan   Assessment and Plan  Pleasant 48-year-old male with generalized anxiety disorder, attention deficit, hyperlipidemia, family history of colon cancer and prostate cancer, mild RAMSES not on CPAP, who presents for the following:    Diagnoses and all orders for this visit:    1. Annual physical exam (Primary): We discussed preventative care including age and patient-appropriate immunizations and cancer screening. We also discussed the importance of exercise and a healthy diet. This is their annual preventative exam.    2. Mixed hyperlipidemia: Low 10-year ASCVD, low risk overall, continue to follow for now  -     Thyroid Panel With TSH  -     Comprehensive Metabolic Panel  -     CBC & Differential  -     Lipid Panel    3. Generalized anxiety disorder: Has tried SSRIs, bupropion, stimulants.  Recommend trial of SNRI as I do not feel that targeting the attention deficit has been effective overall.  He has very good insight and judgment and we have done good joint decision-making on " medication selection and follow-up.  Fortunately symptoms are not severe.  -     DULoxetine (CYMBALTA) 30 MG capsule; Take 1 tab PO QD for mood  Dispense: 60 capsule; Refill: 1  4. Attention deficit disorder, unspecified hyperactivity presence    5. Family history of colon cancer: Follow-up in 5 years  6. Polyp of colon, unspecified part of colon, unspecified type    7. 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 Family history of prostate cancer in father: Some mild dribbling, continue to monitor symptoms and if worsening will do CJ, continue PSA  -     PSA Screen    9. Screening for diabetes mellitus  -     Hemoglobin A1c    10. Screening for malignant neoplasm of prostate  -     PSA Screen    Return to office in 1 year for annual physical or earlier as needed.    Venkatesh Davis MD  Family Medicine  O: [phone]    Disclaimer: Parts of this note were dictated by speech recognition. Minor errors in transcription may be present. Please call if questions.

## 2023-10-06 NOTE — TELEPHONE ENCOUNTER
----- Message from John Lechuga Jr., MD sent at 10/6/2023  9:43 AM EDT -----  Please contact this patient to inform that the polyp is benign and a repeat colonoscopy is needed in 5 years.  Please place in recall for a colonoscopy in 5 years.  Thanks.

## 2023-10-06 NOTE — TELEPHONE ENCOUNTER
Called patient and lvm informing him of his benign c-scope pathology. Advised the he is recommended to repeat his scope in 5 years.   HM tab updated. Recall placed.

## 2023-10-07 LAB
ALBUMIN SERPL-MCNC: 4.8 G/DL (ref 4.1–5.1)
ALBUMIN/GLOB SERPL: 1.8 {RATIO} (ref 1.2–2.2)
ALP SERPL-CCNC: 69 IU/L (ref 44–121)
ALT SERPL-CCNC: 25 IU/L (ref 0–44)
AST SERPL-CCNC: 25 IU/L (ref 0–40)
BASOPHILS # BLD AUTO: 0 X10E3/UL (ref 0–0.2)
BASOPHILS NFR BLD AUTO: 0 %
BILIRUB SERPL-MCNC: 0.6 MG/DL (ref 0–1.2)
BUN SERPL-MCNC: 12 MG/DL (ref 6–24)
BUN/CREAT SERPL: 12 (ref 9–20)
CALCIUM SERPL-MCNC: 9.6 MG/DL (ref 8.7–10.2)
CHLORIDE SERPL-SCNC: 100 MMOL/L (ref 96–106)
CHOLEST SERPL-MCNC: 184 MG/DL (ref 100–199)
CO2 SERPL-SCNC: 26 MMOL/L (ref 20–29)
CREAT SERPL-MCNC: 0.99 MG/DL (ref 0.76–1.27)
EGFRCR SERPLBLD CKD-EPI 2021: 95 ML/MIN/1.73
EOSINOPHIL # BLD AUTO: 0.1 X10E3/UL (ref 0–0.4)
EOSINOPHIL NFR BLD AUTO: 1 %
ERYTHROCYTE [DISTWIDTH] IN BLOOD BY AUTOMATED COUNT: 12.7 % (ref 11.6–15.4)
FT4I SERPL CALC-MCNC: 1.9 (ref 1.2–4.9)
GLOBULIN SER CALC-MCNC: 2.6 G/DL (ref 1.5–4.5)
GLUCOSE SERPL-MCNC: 87 MG/DL (ref 70–99)
HBA1C MFR BLD: 5.4 % (ref 4.8–5.6)
HCT VFR BLD AUTO: 45.4 % (ref 37.5–51)
HDLC SERPL-MCNC: 58 MG/DL
HGB BLD-MCNC: 15.3 G/DL (ref 13–17.7)
IMM GRANULOCYTES # BLD AUTO: 0 X10E3/UL (ref 0–0.1)
IMM GRANULOCYTES NFR BLD AUTO: 0 %
LDLC SERPL CALC-MCNC: 108 MG/DL (ref 0–99)
LYMPHOCYTES # BLD AUTO: 2.1 X10E3/UL (ref 0.7–3.1)
LYMPHOCYTES NFR BLD AUTO: 29 %
MCH RBC QN AUTO: 30.1 PG (ref 26.6–33)
MCHC RBC AUTO-ENTMCNC: 33.7 G/DL (ref 31.5–35.7)
MCV RBC AUTO: 89 FL (ref 79–97)
MONOCYTES # BLD AUTO: 0.5 X10E3/UL (ref 0.1–0.9)
MONOCYTES NFR BLD AUTO: 6 %
NEUTROPHILS # BLD AUTO: 4.6 X10E3/UL (ref 1.4–7)
NEUTROPHILS NFR BLD AUTO: 64 %
PLATELET # BLD AUTO: 239 X10E3/UL (ref 150–450)
POTASSIUM SERPL-SCNC: 4 MMOL/L (ref 3.5–5.2)
PROT SERPL-MCNC: 7.4 G/DL (ref 6–8.5)
PSA SERPL-MCNC: 1.8 NG/ML (ref 0–4)
RBC # BLD AUTO: 5.09 X10E6/UL (ref 4.14–5.8)
SODIUM SERPL-SCNC: 140 MMOL/L (ref 134–144)
T3RU NFR SERPL: 27 % (ref 24–39)
T4 SERPL-MCNC: 7.1 UG/DL (ref 4.5–12)
TRIGL SERPL-MCNC: 102 MG/DL (ref 0–149)
TSH SERPL DL<=0.005 MIU/L-ACNC: 1.79 UIU/ML (ref 0.45–4.5)
VLDLC SERPL CALC-MCNC: 18 MG/DL (ref 5–40)
WBC # BLD AUTO: 7.3 X10E3/UL (ref 3.4–10.8)

## 2023-10-09 NOTE — PROGRESS NOTES
What ever you did over the past year has improved your cholesterol significantly.  It is almost normal range.  Everything else looks great, keep up the good work.

## 2023-11-18 ENCOUNTER — PATIENT MESSAGE (OUTPATIENT)
Dept: INTERNAL MEDICINE | Facility: CLINIC | Age: 48
End: 2023-11-18
Payer: COMMERCIAL

## 2023-11-18 DIAGNOSIS — F41.1 GENERALIZED ANXIETY DISORDER: ICD-10-CM

## 2023-11-20 NOTE — TELEPHONE ENCOUNTER
From: John Roy  To: Venkatesh Davis  Sent: 11/18/2023 7:36 AM EST  Subject: Taking both Cymbalta and Adderall     Hi Dr Davis,   Is it safe to take Adderall while I'm on Cymbalta? Cymbalta has really been great and I'm seeing a big difference in my mood, anxiety and over all energy. Although I still have ADD symptoms that Cymbalta is not taking care of. Do you think if we up the dose of the Cymbalta it would help? Or if I'm able to take the Adderall as needed that would be great. Thanks.

## 2023-11-21 RX ORDER — DULOXETIN HYDROCHLORIDE 60 MG/1
CAPSULE, DELAYED RELEASE ORAL
Qty: 90 CAPSULE | Refills: 3 | Status: SHIPPED | OUTPATIENT
Start: 2023-11-21

## 2023-11-21 RX ORDER — DULOXETIN HYDROCHLORIDE 60 MG/1
CAPSULE, DELAYED RELEASE ORAL
Qty: 90 CAPSULE | Refills: 3 | Status: SHIPPED | OUTPATIENT
Start: 2023-11-21 | End: 2023-11-21 | Stop reason: SDUPTHER

## 2024-01-04 ENCOUNTER — PATIENT MESSAGE (OUTPATIENT)
Dept: INTERNAL MEDICINE | Facility: CLINIC | Age: 49
End: 2024-01-04
Payer: COMMERCIAL

## 2024-01-04 DIAGNOSIS — F98.8 ATTENTION DEFICIT DISORDER, UNSPECIFIED HYPERACTIVITY PRESENCE: Primary | ICD-10-CM

## 2024-01-04 RX ORDER — DEXTROAMPHETAMINE SACCHARATE, AMPHETAMINE ASPARTATE, DEXTROAMPHETAMINE SULFATE AND AMPHETAMINE SULFATE 2.5; 2.5; 2.5; 2.5 MG/1; MG/1; MG/1; MG/1
10 TABLET ORAL 2 TIMES DAILY
Qty: 60 TABLET | Refills: 0 | Status: SHIPPED | OUTPATIENT
Start: 2024-01-04 | End: 2024-01-08 | Stop reason: SDUPTHER

## 2024-01-04 NOTE — TELEPHONE ENCOUNTER
From: John Roy  To: Venkatesh Davis  Sent: 1/4/2024 6:28 AM EST  Subject: Duloxetine and Methylphenidate    Hi Dr Davis,  The Duloxetine is working well, but I'm still having issues on focusing. Could we add a methylphenidate to see if it helps? I tried Concerta awhile back and had some side effects. Could we try Ritalin and see if I have good results? Or is it safe to take Amphetamine Salt Combo oral 10mg instead?

## 2024-01-04 NOTE — ADDENDUM NOTE
Addended by: DEANDRE KU on: 1/4/2024 04:34 PM     Modules accepted: Orders     Department of Anesthesiology  Preprocedure Note       Name:  Gemma Mckeon   Age:  27[address] [facility]  :  1959                                          MRN:  [id number]         Date:  3/22/2021      Surgeon: Ricardo Covington):  Aftab Brice MD    Procedure: Procedure(s):  VENTRICULAR PERITONEAL SHUNT REVISION    Medications prior to admission:   Prior to Admission medications    Medication Sig Start Date End Date Taking?  Authorizing Provider   risperiDONE (RISPERDAL) 0.5 MG tablet Take 0.5 mg by mouth 2 times daily    Historical Provider, MD   senna (SENOKOT) 8.6 MG tablet Take 1 tablet by mouth 2 times daily    Historical Provider, MD   amLODIPine (NORVASC) 5 MG tablet Take 5 mg by mouth daily 20   Historical Provider, MD   levETIRAcetam (KEPPRA) 500 MG tablet Take 1,000 mg by mouth 2 times daily  20   Historical Provider, MD   atorvastatin (LIPITOR) 20 MG tablet Take 20 mg by mouth nightly    Historical Provider, MD   losartan (COZAAR) 100 MG tablet Take 100 mg by mouth daily 20   Historical Provider, MD   Simethicone 80 MG TABS Take 80 mg by mouth every 8 hours as needed    Historical Provider, MD       Current medications:    Current Facility-Administered Medications   Medication Dose Route Frequency Provider Last Rate Last Admin    potassium chloride (KLOR-CON M) extended release tablet 40 mEq  40 mEq Oral PRN KRIS Morrissey        Or    potassium bicarb-citric acid (EFFER-K) effervescent tablet 40 mEq  40 mEq Oral PRN KRIS Morrissey   40 mEq at 21 3184    Or    potassium chloride 10 mEq/100 mL IVPB (Peripheral Line)  10 mEq Intravenous PRN KRIS Monge        sodium chloride flush 0.9 % injection 10 mL  10 mL Intravenous 2 times per day Quinton Cane, DO   10 mL at 21    sodium chloride flush 0.9 % injection 10 mL  10 mL Intravenous PRN Quinton Cane, DO        [Held by provider] enoxaparin (LOVENOX) injection 40 mg  40 mg Subcutaneous Q12H Quinton Cane, DO  polyethylene glycol (GLYCOLAX) packet 17 g  17 g Oral Daily PRN Justin Tiffany, DO        acetaminophen (TYLENOL) tablet 650 mg  650 mg Oral Q6H PRN Justin Tiffany, DO        Or    acetaminophen (TYLENOL) suppository 650 mg  650 mg Rectal Q6H PRN Justin Tiffany, DO   650 mg at 03/18/21 0431    ondansetron (ZOFRAN-ODT) disintegrating tablet 4 mg  4 mg Oral Q6H PRN Justin Tiffany, DO        Or    ondansetron (ZOFRAN) injection 4 mg  4 mg Intravenous Q6H PRN Justin Tiffany, DO        amLODIPine (NORVASC) tablet 5 mg  5 mg Oral Daily Justin Tiffany, DO   5 mg at 03/21/21 0932    atorvastatin (LIPITOR) tablet 20 mg  20 mg Oral Nightly Buster Rist Loman, DO   20 mg at 03/21/21 1953    losartan (COZAAR) tablet 100 mg  100 mg Oral Daily Justin Tiffany, DO   100 mg at 03/21/21 0932    risperiDONE (RISPERDAL) tablet 0.5 mg  0.5 mg Oral BID Justin Tiffany, DO   0.5 mg at 03/21/21 1953    senna (SENOKOT) tablet 8.6 mg  1 tablet Oral BID Justin Tiffany, DO   8.6 mg at 03/21/21 1953    simethicone (MYLICON) chewable tablet 80 mg  80 mg Oral Q8H PRN Justin Tiffany, DO        [Held by provider] levETIRAcetam (KEPPRA) tablet 1,000 mg  1,000 mg Oral BID Justin Tiffany, DO        calcium replacement protocol   Other RX Placeholder Justin Tiffany, DO        piperacillin-tazobactam (ZOSYN) 3,375 mg in dextrose 5 % 50 mL IVPB (mini-bag)  3,375 mg Intravenous Q8H Josue Siddiqui, DO 12.5 mL/hr at 03/22/21 0345 3,375 mg at 03/22/21 0345    nystatin (MYCOSTATIN) 909217 UNIT/ML suspension 500,000 Units  5 mL Mouth/Throat 4x Daily KRIS Morrissey   500,000 Units at 03/21/21 1953    levETIRAcetam (KEPPRA) 1,000 mg in sodium chloride 0.9 % 100 mL IVPB  1,000 mg Intravenous Q12H Magdalene Anthony, [address]   Stopped at 03/21/21 1908    hydrALAZINE (APRESOLINE) injection 10 mg  10 mg Intravenous Q6H PRN [employer], [address]        lactated ringers infusion   Intravenous Continuous [employer], [address] 75 mL/hr at 03/22/21 0142 New Bag at 03/22/21 0142    Tbo-Filgrastim (GRANIX) injection 480 mcg  480 mcg Subcutaneous QPM Christine Marie MD   480 mcg at 21 1853     Facility-Administered Medications Ordered in Other Encounters   Medication Dose Route Frequency Provider Last Rate Last Admin    phenylephrine (VAZCULEP) injection    PRN Darcella Lynn   200 mcg at 21 5474    propofol injection    PRN Darcella Chika   150 mg at 21 0802    rocuronium (ZEMURON) injection    PRN Darcella Lynn   50 mg at 21 0843    fentaNYL (SUBLIMAZE) injection    PRN Darcella Chika   100 mcg at 21 0802    lidocaine injection    PRN Darcella Chika   100 mg at 21 0802    dexamethasone (PF) (DECADRON) injection    PRN Darcella Lynn   10 mg at 21 0827    glycopyrrolate (ROBINUL) injection    PRN Darcella Chika   0.4 mg at 21 3223    ePHEDrine injection    PRN Darcella Chika   10 mg at 21 7380       Allergies:     Allergies   Allergen Reactions    Geodon [Ziprasidone]        Problem List:    Patient Active Problem List   Diagnosis Code    Neutropenic fever ([facility] D70.9, R50.81    Septicemia ([facility] A41.9    Altered mental status R41.82       Past Medical History:        Diagnosis Date    Brain aneurysm     Hypertension     Seizures ([facility]        Past Surgical History:        Procedure Laterality Date     SECTION      x2    GALLBLADDER SURGERY      partial        Social History:    Social History     Tobacco Use    Smoking status: Former Smoker     Quit date: 2016     Years since quittin.0    Smokeless tobacco: Never Used   Substance Use Topics    Alcohol use: Never     Frequency: Never                                Counseling given: Not Answered      Vital Signs (Current):   Vitals:    21 1459 21 1952 21 2323 21 0347   BP: 127/68 (!) 120/58 (!) 123/48 (!) 142/61   Pulse: 62 69 61 60   Resp: 20 18 16 18   Temp: 97.7 °F (36.5 °C) 97.5 °F (36.4 °C) 97.8 °F (36.6 °C) 98 °F (36.7 °C)   TempSrc: Oral Oral Oral Oral   SpO2: 95% 95% 92% 94%   Weight:       Height:                                                  BP Readings from Last 3 Encounters:   03/22/21 (!) 142/61   05/05/20 118/70       NPO Status:                                                                                 BMI:   Wt Readings from Last 3 Encounters:   03/21/21 280 lb 6.4 oz (127.2 kg)   05/05/20 (!) 331 lb 9.6 oz (150.4 kg)     Body mass index is 45.26 kg/m². CBC:   Lab Results   Component Value Date    WBC 1.2 03/22/2021    RBC 3.51 03/22/2021    HGB 11.2 03/22/2021    HCT 35.5 03/22/2021    .1 03/22/2021    PLT 89 03/22/2021       CMP:   Lab Results   Component Value Date     03/21/2021    K 3.4 03/21/2021     03/21/2021    CO2 26 03/21/2021    BUN 5 03/21/2021    CREATININE 0.7 03/21/2021    LABGLOM 85 03/21/2021    GLUCOSE 100 03/21/2021    PROT 7.0 03/21/2021    CALCIUM 9.9 03/21/2021    BILITOT 1.0 03/21/2021    ALKPHOS 297 03/21/2021     03/21/2021    ALT 76 03/21/2021       POC Tests: No results for input(s): POCGLU, POCNA, POCK, POCCL, POCBUN, POCHEMO, POCHCT in the last 72 hours. Coags:   Lab Results   Component Value Date    INR 1.35 03/17/2021    APTT 30.0 03/17/2021       HCG (If Applicable): No results found for: PREGTESTUR, PREGSERUM, HCG, HCGQUANT     ABGs: No results found for: PHART, PO2ART, BHA4SBU, PHD0PZW, BEART, O6JIPMXP     Type & Screen (If Applicable):  No results found for: LABABO, LABRH    Drug/Infectious Status (If Applicable):  No results found for: HIV, HEPCAB    COVID-19 Screening (If Applicable):   Lab Results   Component Value Date    COVID19 NOT DETECTED 03/17/2021           Anesthesia Evaluation   no history of anesthetic complications:   Airway: Mallampati: II  TM distance: >3 FB   Neck ROM: full  Mouth opening: > = 3 FB Dental:          Pulmonary:normal exam              Patient did not smoke on day of surgery. Cardiovascular:  Exercise tolerance: poor (<4 METS),   (+) hypertension:,                   Neuro/Psych:   (+) seizures: well controlled,              ROS comment:  shunt in place, h/o brain aneurysm GI/Hepatic/Renal:   (+) morbid obesity          Endo/Other:              Pt had no PAT visit       Abdominal:   (+) obese,         Vascular:                                        Anesthesia Plan      general     ASA 3       Induction: intravenous. MIPS: Postoperative opioids intended and Prophylactic antiemetics administered. Anesthetic plan and risks discussed with patient. Plan discussed with CRNA.                   Rosetta Romero MD   3/22/2021

## 2024-01-05 DIAGNOSIS — F98.8 ATTENTION DEFICIT DISORDER, UNSPECIFIED HYPERACTIVITY PRESENCE: ICD-10-CM

## 2024-01-05 RX ORDER — DEXTROAMPHETAMINE SACCHARATE, AMPHETAMINE ASPARTATE, DEXTROAMPHETAMINE SULFATE AND AMPHETAMINE SULFATE 2.5; 2.5; 2.5; 2.5 MG/1; MG/1; MG/1; MG/1
10 TABLET ORAL 2 TIMES DAILY
Qty: 60 TABLET | Refills: 0 | Status: CANCELLED | OUTPATIENT
Start: 2024-01-05

## 2024-01-05 NOTE — TELEPHONE ENCOUNTER
"Caller: John Roy \"Phillip\"    Relationship: Self    Best call back number: [phone]     Requested Prescriptions:   Requested Prescriptions     Pending Prescriptions Disp Refills    amphetamine-dextroamphetamine (Adderall) 10 MG tablet 60 tablet 0     Sig: Take 1 tablet by mouth 2 (Two) Times a Day.        Pharmacy where request should be sent: [facility] [id number] [place] [id number] [facility]  AT [address] [phone] [facility] [phone]      Last office visit with prescribing clinician: 10/6/2023   Last telemedicine visit with prescribing clinician: Visit date not found   Next office visit with prescribing clinician: 10/11/2024     Additional details provided by patient: PATIENT IS CHANGING PHARMACIES DUE TO ADDERRAL SHORTAGE AND HIS PRESCRIPTION MAY NEED RE-WRITTEN.    Kylie Lawrence Rep   01/05/24 15:30 EST   "

## 2024-01-08 RX ORDER — DEXTROAMPHETAMINE SACCHARATE, AMPHETAMINE ASPARTATE, DEXTROAMPHETAMINE SULFATE AND AMPHETAMINE SULFATE 2.5; 2.5; 2.5; 2.5 MG/1; MG/1; MG/1; MG/1
10 TABLET ORAL 2 TIMES DAILY
Qty: 60 TABLET | Refills: 0 | Status: SHIPPED | OUTPATIENT
Start: 2024-01-08

## 2024-02-02 DIAGNOSIS — F98.8 ATTENTION DEFICIT DISORDER, UNSPECIFIED HYPERACTIVITY PRESENCE: ICD-10-CM

## 2024-02-02 RX ORDER — DEXTROAMPHETAMINE SACCHARATE, AMPHETAMINE ASPARTATE, DEXTROAMPHETAMINE SULFATE AND AMPHETAMINE SULFATE 5; 5; 5; 5 MG/1; MG/1; MG/1; MG/1
20 TABLET ORAL DAILY
Qty: 60 TABLET | Refills: 0 | Status: SHIPPED | OUTPATIENT
Start: 2024-02-02

## 2024-02-02 RX ORDER — DEXTROAMPHETAMINE SACCHARATE, AMPHETAMINE ASPARTATE, DEXTROAMPHETAMINE SULFATE AND AMPHETAMINE SULFATE 2.5; 2.5; 2.5; 2.5 MG/1; MG/1; MG/1; MG/1
10 TABLET ORAL 2 TIMES DAILY
Qty: 60 TABLET | Refills: 0 | Status: CANCELLED | OUTPATIENT
Start: 2024-02-02

## 2024-02-02 NOTE — TELEPHONE ENCOUNTER
Rx Refill Note  Requested Prescriptions     Pending Prescriptions Disp Refills    amphetamine-dextroamphetamine (Adderall) 10 MG tablet 60 tablet 0     Sig: Take 1 tablet by mouth 2 (Two) Times a Day.      Last office visit with prescribing clinician: 10/6/2023   Last telemedicine visit with prescribing clinician: Visit date not found   Next office visit with prescribing clinician: 10/11/2024      Patient Comment: I noticed a difference in the effectiveness of this  that I'm currently taking. It doesn't seem as potent as the one I was on before our insurance changed from [employer] to [employer]. Might be because it's generic. Could we up the dose to see if I there's improvement?     Urine Drug Screen - Urine, Clean Catch (05/08/2023 08:37)

## 2024-03-02 DIAGNOSIS — F98.8 ATTENTION DEFICIT DISORDER, UNSPECIFIED HYPERACTIVITY PRESENCE: ICD-10-CM

## 2024-03-04 RX ORDER — DEXTROAMPHETAMINE SACCHARATE, AMPHETAMINE ASPARTATE, DEXTROAMPHETAMINE SULFATE AND AMPHETAMINE SULFATE 5; 5; 5; 5 MG/1; MG/1; MG/1; MG/1
20 TABLET ORAL DAILY
Qty: 60 TABLET | Refills: 0 | Status: SHIPPED | OUTPATIENT
Start: 2024-03-04 | End: 2024-03-06 | Stop reason: SDUPTHER

## 2024-03-04 NOTE — TELEPHONE ENCOUNTER
Rx Refill Note  Requested Prescriptions     Pending Prescriptions Disp Refills    amphetamine-dextroamphetamine (Adderall) 20 MG tablet 60 tablet 0     Sig: Take 1 tablet by mouth Daily.      Last office visit with prescribing clinician: 10/6/2023   Last telemedicine visit with prescribing clinician: Visit date not found   Next office visit with prescribing clinician: 10/11/2024                         Would you like a call back once the refill request has been completed: [] Yes [] No    If the office needs to give you a call back, can they leave a voicemail: [] Yes [] No    Lindsay Santacruz MA  03/04/24, 09:27 EST

## 2024-03-05 ENCOUNTER — PATIENT MESSAGE (OUTPATIENT)
Dept: INTERNAL MEDICINE | Facility: CLINIC | Age: 49
End: 2024-03-05
Payer: COMMERCIAL

## 2024-03-05 DIAGNOSIS — F98.8 ATTENTION DEFICIT DISORDER, UNSPECIFIED HYPERACTIVITY PRESENCE: ICD-10-CM

## 2024-03-06 DIAGNOSIS — F98.8 ATTENTION DEFICIT DISORDER, UNSPECIFIED HYPERACTIVITY PRESENCE: ICD-10-CM

## 2024-03-06 RX ORDER — DEXTROAMPHETAMINE SACCHARATE, AMPHETAMINE ASPARTATE, DEXTROAMPHETAMINE SULFATE AND AMPHETAMINE SULFATE 5; 5; 5; 5 MG/1; MG/1; MG/1; MG/1
20 TABLET ORAL 2 TIMES DAILY
Qty: 60 TABLET | Refills: 0 | Status: SHIPPED | OUTPATIENT
Start: 2024-03-06

## 2024-03-06 RX ORDER — DEXTROAMPHETAMINE SACCHARATE, AMPHETAMINE ASPARTATE, DEXTROAMPHETAMINE SULFATE AND AMPHETAMINE SULFATE 5; 5; 5; 5 MG/1; MG/1; MG/1; MG/1
20 TABLET ORAL DAILY
Qty: 60 TABLET | Refills: 0 | OUTPATIENT
Start: 2024-03-06

## 2024-03-06 NOTE — TELEPHONE ENCOUNTER
From: John Roy  To: Venkatesh Davis  Sent: 3/5/2024 6:24 PM EST  Subject: Adderall     Hey Dr Davis, I tried to pickup my RX and it they said my insurance will not cover until 3/25. Is that because it says on the bottle take 1 daily? I've been taking 2 daily like I was with my previous dose of 10MG. It's been working really well with the morning and afternoon dose. Are you able to update the RX so insurance will cover? Or is there another option? Thanks!

## 2024-03-06 NOTE — TELEPHONE ENCOUNTER
"Caller: John Roy \"Phillip\"    Relationship: Self    Best call back number:     [phone]       Requested Prescriptions:   Requested Prescriptions     Pending Prescriptions Disp Refills    amphetamine-dextroamphetamine (Adderall) 20 MG tablet 60 tablet 0     Sig: Take 1 tablet by mouth Daily.        Pharmacy where request should be sent: [facility] [id number] [place] [id number] [facility]  AT [address] [phone] PH - [phone]      Last office visit with prescribing clinician: 10/6/2023   Last telemedicine visit with prescribing clinician: Visit date not found   Next office visit with prescribing clinician: 10/11/2024     Additional details provided by patient: PATIENT HAS BEEN TAKING 2 PER DAY. HE STATES THAT IT HAS BEEN WORKING FOR HIM AND WOULD LIKE TO HAVE THE AMOUNT THAT HE TAKES A DAY INCREASED.    Does the patient have less than a 3 day supply:  [x] Yes  [] No    Would you like a call back once the refill request has been completed: [x] Yes [] No    If the office needs to give you a call back, can they leave a voicemail: [x] Yes [] No    Kylie Andersen Rep   03/06/24 12:45 EST           "

## 2024-04-02 DIAGNOSIS — F98.8 ATTENTION DEFICIT DISORDER, UNSPECIFIED HYPERACTIVITY PRESENCE: ICD-10-CM

## 2024-04-03 RX ORDER — DEXTROAMPHETAMINE SACCHARATE, AMPHETAMINE ASPARTATE, DEXTROAMPHETAMINE SULFATE AND AMPHETAMINE SULFATE 5; 5; 5; 5 MG/1; MG/1; MG/1; MG/1
20 TABLET ORAL 2 TIMES DAILY
Qty: 60 TABLET | Refills: 0 | Status: SHIPPED | OUTPATIENT
Start: 2024-04-06

## 2024-04-03 NOTE — TELEPHONE ENCOUNTER
Rx Refill Note  Requested Prescriptions     Pending Prescriptions Disp Refills    amphetamine-dextroamphetamine (Adderall) 20 MG tablet 60 tablet 0     Sig: Take 1 tablet by mouth 2 (Two) Times a Day.      Last office visit with prescribing clinician: 10/6/2023   Last telemedicine visit with prescribing clinician: Visit date not found   Next office visit with prescribing clinician: 10/11/2024                         Would you like a call back once the refill request has been completed: [] Yes [] No    If the office needs to give you a call back, can they leave a voicemail: [] Yes [] No    Lindsay Santacruz MA  04/03/24, 10:03 EDT

## 2024-05-14 DIAGNOSIS — F98.8 ATTENTION DEFICIT DISORDER, UNSPECIFIED HYPERACTIVITY PRESENCE: ICD-10-CM

## 2024-05-14 RX ORDER — DEXTROAMPHETAMINE SACCHARATE, AMPHETAMINE ASPARTATE, DEXTROAMPHETAMINE SULFATE AND AMPHETAMINE SULFATE 5; 5; 5; 5 MG/1; MG/1; MG/1; MG/1
20 TABLET ORAL 2 TIMES DAILY
Qty: 60 TABLET | Refills: 0 | Status: SHIPPED | OUTPATIENT
Start: 2024-05-14

## 2024-05-14 NOTE — TELEPHONE ENCOUNTER
Rx Refill Note  Requested Prescriptions     Pending Prescriptions Disp Refills    amphetamine-dextroamphetamine (Adderall) 20 MG tablet 60 tablet 0     Sig: Take 1 tablet by mouth 2 (Two) Times a Day.      Last office visit with prescribing clinician: 10/6/2023   Last telemedicine visit with prescribing clinician: Visit date not found   Next office visit with prescribing clinician: 10/11/2024          Urine Drug Screen - Urine, Clean Catch (05/08/2023 08:37)     Edna Pastor LPN  05/14/24, 10:46 EDT

## 2024-06-14 DIAGNOSIS — F41.1 GENERALIZED ANXIETY DISORDER: ICD-10-CM

## 2024-06-14 DIAGNOSIS — F98.8 ATTENTION DEFICIT DISORDER, UNSPECIFIED HYPERACTIVITY PRESENCE: ICD-10-CM

## 2024-06-14 RX ORDER — DEXTROAMPHETAMINE SACCHARATE, AMPHETAMINE ASPARTATE, DEXTROAMPHETAMINE SULFATE AND AMPHETAMINE SULFATE 5; 5; 5; 5 MG/1; MG/1; MG/1; MG/1
20 TABLET ORAL 2 TIMES DAILY
Qty: 60 TABLET | Refills: 0 | Status: SHIPPED | OUTPATIENT
Start: 2024-06-14

## 2024-06-14 RX ORDER — DULOXETIN HYDROCHLORIDE 60 MG/1
CAPSULE, DELAYED RELEASE ORAL
Qty: 90 CAPSULE | Refills: 3 | Status: SHIPPED | OUTPATIENT
Start: 2024-06-14

## 2024-06-14 NOTE — TELEPHONE ENCOUNTER
Rx Refill Note  Requested Prescriptions     Pending Prescriptions Disp Refills    amphetamine-dextroamphetamine (Adderall) 20 MG tablet 60 tablet 0     Sig: Take 1 tablet by mouth 2 (Two) Times a Day.     Signed Prescriptions Disp Refills    DULoxetine (CYMBALTA) 60 MG capsule 90 capsule 3     Sig: Take 1 tab PO QD for mood     Authorizing Provider: DEANDRE KU     Ordering User: MELINA PASTOR      Last office visit with prescribing clinician: 10/6/2023   Last telemedicine visit with prescribing clinician: Visit date not found   Next office visit with prescribing clinician: 10/11/2024     CONTRACT NOT ON FILE       Urine Drug Screen - Urine, Clean Catch (05/08/2023 08:37)     Melina Pastor LPN  06/14/24, 09:21 EDT

## 2024-06-23 ENCOUNTER — PATIENT MESSAGE (OUTPATIENT)
Dept: INTERNAL MEDICINE | Facility: CLINIC | Age: 49
End: 2024-06-23
Payer: COMMERCIAL

## 2024-06-24 NOTE — TELEPHONE ENCOUNTER
From: John Roy  To: Venkatesh Davis  Sent: 6/23/2024 9:34 PM EDT  Subject: Pain in right testicle area also pain above groin    Hi Dr Davis,   I'm having pain in my right side groin area and right testicle. I did drive 9 hours today, but I think it's odd that it would start having pain, when I've driven long distance and this has not happened in the past.

## 2024-07-19 DIAGNOSIS — F98.8 ATTENTION DEFICIT DISORDER, UNSPECIFIED HYPERACTIVITY PRESENCE: ICD-10-CM

## 2024-07-19 RX ORDER — DEXTROAMPHETAMINE SACCHARATE, AMPHETAMINE ASPARTATE, DEXTROAMPHETAMINE SULFATE AND AMPHETAMINE SULFATE 5; 5; 5; 5 MG/1; MG/1; MG/1; MG/1
20 TABLET ORAL 2 TIMES DAILY
Qty: 60 TABLET | Refills: 0 | Status: SHIPPED | OUTPATIENT
Start: 2024-07-19

## 2024-07-19 NOTE — TELEPHONE ENCOUNTER
Rx Refill Note  Requested Prescriptions     Pending Prescriptions Disp Refills    amphetamine-dextroamphetamine (Adderall) 20 MG tablet 60 tablet 0     Sig: Take 1 tablet by mouth 2 (Two) Times a Day.      Last office visit with prescribing clinician: 10/6/2023   Last telemedicine visit with prescribing clinician: Visit date not found   Next office visit with prescribing clinician: 10/11/2024                         Would you like a call back once the refill request has been completed: [] Yes [] No    If the office needs to give you a call back, can they leave a voicemail: [] Yes [] No    Lindsay Santacruz MA  07/19/24, 16:20 EDT

## 2024-07-25 ENCOUNTER — TELEPHONE (OUTPATIENT)
Dept: INTERNAL MEDICINE | Facility: CLINIC | Age: 49
End: 2024-07-25
Payer: COMMERCIAL

## 2024-07-25 DIAGNOSIS — F98.8 ATTENTION DEFICIT DISORDER, UNSPECIFIED HYPERACTIVITY PRESENCE: ICD-10-CM

## 2024-07-25 RX ORDER — DEXTROAMPHETAMINE SACCHARATE, AMPHETAMINE ASPARTATE, DEXTROAMPHETAMINE SULFATE AND AMPHETAMINE SULFATE 5; 5; 5; 5 MG/1; MG/1; MG/1; MG/1
20 TABLET ORAL 2 TIMES DAILY
Qty: 60 TABLET | Refills: 0 | Status: CANCELLED | OUTPATIENT
Start: 2024-07-25

## 2024-07-25 NOTE — TELEPHONE ENCOUNTER
"  Caller: John Roy \"Phillip\"    Relationship: Self    Best call back number: [phone]     Requested Prescriptions:   Requested Prescriptions     Pending Prescriptions Disp Refills    amphetamine-dextroamphetamine (Adderall) 20 MG tablet 60 tablet 0     Sig: Take 1 tablet by mouth 2 (Two) Times a Day.        Pharmacy where request should be sent: [address] - [phone]  - [phone]     Last office visit with prescribing clinician: 10/6/2023   Last telemedicine visit with prescribing clinician: Visit date not found   Next office visit with prescribing clinician: 10/11/2024     Additional details provided by patient: PATIENT IS OUT OF TOWN AND NEEDS A PRESCRIPTION TO TO PHARMACY IN [place]     Does the patient have less than a 3 day supply:  [x] Yes  [] No    Would you like a call back once the refill request has been completed: [] Yes [x] No    If the office needs to give you a call back, can they leave a voicemail: [] Yes [x] No    Kylie Kaufman Rep   07/25/24 12:46 EDT           "

## 2024-07-25 NOTE — TELEPHONE ENCOUNTER
Left detailed message for pt that we cannot send medication to [place].  Adderall is highly controlled and he legally will not be able to send it he doesn't have his CARLOS for [place] and they will not allow the prescription.

## 2024-09-05 DIAGNOSIS — F98.8 ATTENTION DEFICIT DISORDER, UNSPECIFIED HYPERACTIVITY PRESENCE: ICD-10-CM

## 2024-09-05 RX ORDER — DEXTROAMPHETAMINE SACCHARATE, AMPHETAMINE ASPARTATE, DEXTROAMPHETAMINE SULFATE AND AMPHETAMINE SULFATE 5; 5; 5; 5 MG/1; MG/1; MG/1; MG/1
20 TABLET ORAL 2 TIMES DAILY
Qty: 60 TABLET | Refills: 0 | Status: SHIPPED | OUTPATIENT
Start: 2024-09-05

## 2024-09-05 NOTE — TELEPHONE ENCOUNTER
----- Message from Sonali Monge sent at 5/15/2019  2:16 PM CDT -----  Contact: JERRI DERAS [[id number]]                                                 MEDICATION  REFILL  REQUEST      Please refill the medication(s) listed below. Please call the patient when the prescription(s) is ready for  at this phone number  JERRI DERAS / #  [phone] (M)      Medication #1  acetaminophen-codeine 300-30mg (TYLENOL #3) 300-30 mg Tab          Preferred Pharmacy:   [facility]/pharmacy #[id number] - [place] [address] [address]     [phone] (Phone)  [phone] (Fax)             Rx Refill Note  Requested Prescriptions     Pending Prescriptions Disp Refills    amphetamine-dextroamphetamine (Adderall) 20 MG tablet 60 tablet 0     Sig: Take 1 tablet by mouth 2 (Two) Times a Day.      Last office visit with prescribing clinician: 10/6/2023   Last telemedicine visit with prescribing clinician: Visit date not found   Next office visit with prescribing clinician: 10/11/2024          Urine Drug Screen - Urine, Clean Catch (05/08/2023 08:37)     Edna Pastor LPN  09/05/24, 15:58 EDT

## 2024-09-21 DIAGNOSIS — F41.1 GENERALIZED ANXIETY DISORDER: ICD-10-CM

## 2024-09-23 RX ORDER — DULOXETIN HYDROCHLORIDE 60 MG/1
CAPSULE, DELAYED RELEASE ORAL
Qty: 90 CAPSULE | Refills: 3 | Status: SHIPPED | OUTPATIENT
Start: 2024-09-23

## 2024-10-11 ENCOUNTER — OFFICE VISIT (OUTPATIENT)
Dept: INTERNAL MEDICINE | Facility: CLINIC | Age: 49
End: 2024-10-11
Payer: COMMERCIAL

## 2024-10-11 VITALS
OXYGEN SATURATION: 97 % | BODY MASS INDEX: 25.62 KG/M2 | WEIGHT: 173 LBS | DIASTOLIC BLOOD PRESSURE: 74 MMHG | HEART RATE: 110 BPM | SYSTOLIC BLOOD PRESSURE: 120 MMHG | TEMPERATURE: 97.3 F | HEIGHT: 69 IN

## 2024-10-11 DIAGNOSIS — E78.2 MIXED HYPERLIPIDEMIA: ICD-10-CM

## 2024-10-11 DIAGNOSIS — Z23 NEED FOR IMMUNIZATION AGAINST INFLUENZA: ICD-10-CM

## 2024-10-11 DIAGNOSIS — F41.1 GENERALIZED ANXIETY DISORDER: ICD-10-CM

## 2024-10-11 DIAGNOSIS — Z12.5 SCREENING FOR MALIGNANT NEOPLASM OF PROSTATE: ICD-10-CM

## 2024-10-11 DIAGNOSIS — Z80.42 FAMILY HISTORY OF PROSTATE CANCER IN FATHER: ICD-10-CM

## 2024-10-11 DIAGNOSIS — R39.11 URINARY HESITANCY: ICD-10-CM

## 2024-10-11 DIAGNOSIS — Z13.89 SCREENING FOR BLOOD OR PROTEIN IN URINE: ICD-10-CM

## 2024-10-11 DIAGNOSIS — Z00.00 ANNUAL PHYSICAL EXAM: Primary | ICD-10-CM

## 2024-10-11 DIAGNOSIS — K59.00 CONSTIPATION, UNSPECIFIED CONSTIPATION TYPE: ICD-10-CM

## 2024-10-11 DIAGNOSIS — F90.9 ATTENTION DEFICIT HYPERACTIVITY DISORDER (ADHD), UNSPECIFIED ADHD TYPE: ICD-10-CM

## 2024-10-11 DIAGNOSIS — M25.562 CHRONIC PAIN OF LEFT KNEE: ICD-10-CM

## 2024-10-11 DIAGNOSIS — Z80.0 FAMILY HISTORY OF COLON CANCER: ICD-10-CM

## 2024-10-11 DIAGNOSIS — G89.29 CHRONIC PAIN OF LEFT KNEE: ICD-10-CM

## 2024-10-11 DIAGNOSIS — Z13.1 SCREENING FOR DIABETES MELLITUS: ICD-10-CM

## 2024-10-11 DIAGNOSIS — R06.83 SNORING: ICD-10-CM

## 2024-10-11 PROCEDURE — 90656 IIV3 VACC NO PRSV 0.5 ML IM: CPT | Performed by: FAMILY MEDICINE

## 2024-10-11 PROCEDURE — 99396 PREV VISIT EST AGE 40-64: CPT | Performed by: FAMILY MEDICINE

## 2024-10-11 PROCEDURE — 90471 IMMUNIZATION ADMIN: CPT | Performed by: FAMILY MEDICINE

## 2024-10-11 RX ORDER — DEXTROAMPHETAMINE SACCHARATE, AMPHETAMINE ASPARTATE, DEXTROAMPHETAMINE SULFATE AND AMPHETAMINE SULFATE 5; 5; 5; 5 MG/1; MG/1; MG/1; MG/1
TABLET ORAL
Qty: 30 TABLET | Refills: 0 | Status: SHIPPED | OUTPATIENT
Start: 2024-10-11

## 2024-10-11 RX ORDER — DEXTROAMPHETAMINE SACCHARATE, AMPHETAMINE ASPARTATE MONOHYDRATE, DEXTROAMPHETAMINE SULFATE AND AMPHETAMINE SULFATE 5; 5; 5; 5 MG/1; MG/1; MG/1; MG/1
20 CAPSULE, EXTENDED RELEASE ORAL EVERY MORNING
Qty: 30 CAPSULE | Refills: 0 | Status: SHIPPED | OUTPATIENT
Start: 2024-10-11

## 2024-10-11 NOTE — PROGRESS NOTES
Date of Encounter: 10/11/2024  Patient: John Roy,  1975    Subjective   History of Presenting Illness  Chief complaint: Annual physical     No acute concerns    ADHD, started Adderall very effective overall but does have an issue with getting very fatigued, irritability when he gets home from work around 6:00.  Sometimes has some insomnia as well although not too frequently.  He denies palpitations, headaches. Normotensive.  Read about Vyvanse.    Generalized anxiety disorder doing well on duloxetine.  Wonders whether he should come off it but external stressors are fairly stable.    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  Flow is still good overall, no nocturia.    Some chronic pain of his left knee with stairs otherwise not functionally impairing     , 3 children ages 10, 15, 14. Sexually active.  Prior 15-pack-year smoker quit at age 29. 3 drinks or less per week. Uses cannabis recreationally rarely. Not exercising currently.  Diet is low fiber.  He has had some constipation recently that did improve with Metamucil bars. Colon cancer screening  with 1 tubular adenoma and 5-year follow-up due to family history of colon cancer. Works at [employer]. Has a living will.  Amenable to influenza vaccine today.    The following portions of the patient's history were reviewed and updated as appropriate: allergies, current medications, past family history, past medical history, past social history, past surgical history and problem list.    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    Mixed hyperlipidemia    Colon polyps    Family history of prostate cancer in father    Sebaceous cyst    Synovial cyst    Generalized anxiety disorder    Attention deficit disorder     Family history of colon cancer    Urinary hesitancy    Chronic pain of left knee    Constipation     Past Medical History:   Diagnosis Date    ADHD (attention deficit hyperactivity disorder) 24    Anxiety 24    Chronic foot pain, left 2020    Colon polyp 2009    Epigastric discomfort 2020    H/O removal of neck cyst 2023    Left cervical radiculopathy 10/06/2022    Viral gastritis 2017     Past Surgical History:   Procedure Laterality Date    COLONOSCOPY      COLONOSCOPY N/A 2023    Procedure: COLONOSCOPY to cecum with hot snare polypectomy;  Surgeon: John Lechuga Jr., MD;  Location: [facility] ENDOSCOPY;  Service: General;  Laterality: N/A;  Pre: hx polyps, family hx colon cancer  Post: polyp    VASECTOMY  2016     Family History   Problem Relation Age of Onset    Cancer Mother     Early death Mother         Age 54    Learning disabilities Mother     Prostate cancer Father     Cancer Father         Prostate Cancer    COPD Father     Anxiety disorder Father     Colon cancer Father     Colon cancer Paternal Uncle     Depression Maternal Grandmother     Colon cancer Paternal Grandmother     Malig Hyperthermia Neg Hx        Current Outpatient Medications:     amphetamine-dextroamphetamine (Adderall) 20 MG tablet, Take 1 tablet in the afternoon, Disp: 30 tablet, Rfl: 0    DULoxetine (CYMBALTA) 60 MG capsule, Take 1 tab PO QD for mood, Disp: 90 capsule, Rfl: 3    amphetamine-dextroamphetamine XR (Adderall XR) 20 MG 24 hr capsule, Take 1 capsule by mouth Every Morning, Disp: 30 capsule, Rfl: 0  No Known Allergies  Social History     Tobacco Use    Smoking status: Former     Current packs/day: 0.00     Types: Cigarettes     Quit date: 1996     Years since quittin.7    Smokeless tobacco: Former   Vaping Use    Vaping status: Never Used   Substance Use Topics    Alcohol use: Yes     Alcohol/week: 2.0 standard drinks of alcohol     Types: 2 Cans of beer per week     Comment:  "socially    Drug use: No          Objective   Physical Exam  Vitals:    10/11/24 1243   BP: 120/74   BP Location: Left arm   Patient Position: Sitting   Cuff Size: Adult   Pulse: 110   Temp: 97.3 °F (36.3 °C)   TempSrc: Infrared   SpO2: 97%   Weight: 78.5 kg (173 lb)   Height: 175.3 cm (69\")     Body mass index is 25.55 kg/m².    Constitutional: NAD.  Eyes: EOMI. PERRLA. Normal conjunctiva.  Ear, nose, mouth, throat: No tonsillar exudates or erythema.   Normal nasal mucosa. Normal external ear canals and TMs bilaterally.  Cardiovascular: RRR. No murmurs. No LE edema b/l. Radial pulses 2+ bilaterally.  Pulmonary: CTA b/l. Good effort.  Integumentary: No rashes or wounds on face or upper extremities.  Lymphatic: No anterior cervical lymphadenopathy.  Endocrine: No thyromegaly or palpable thyroid nodules.  Psychiatric: Normal affect. Normal thought content.  Gastrointestinal: Nondistended. No hepatosplenomegaly. No focal tenderness to palpation.      Assessment & Plan   Assessment and Plan  Pleasant 49-year-old male with hyperlipidemia, generalized anxiety disorder, ADHD, mild RAMSES not requiring CPAP, family history of prostate cancer and colon cancer, chronic left knee pain, who presents for the following:    Diagnoses and all orders for this visit:    1. Annual physical exam (Primary):  We discussed preventative care including age and patient-appropriate immunizations and cancer screening. We also discussed the importance of exercise and a healthy diet. This is their annual preventative exam.    2. Mixed hyperlipidemia: Low 10-year ASCVD will follow  -     CBC & Differential  -     Comprehensive Metabolic Panel  -     Lipid Panel  -     TSH    3. Generalized anxiety disorder: Controlled on duloxetine would recommend continuing    4. Attention deficit hyperactivity disorder (ADHD), unspecified ADHD type: We will try extended release Adderall in the morning with short acting in the afternoon to see if that helps with " his crash.  If not we can try Vyvanse, he can message via mySugr if he wants to make this change.  Timmy is appropriate.  UDS today.  -     amphetamine-dextroamphetamine XR (Adderall XR) 20 MG 24 hr capsule; Take 1 capsule by mouth Every Morning  Dispense: 30 capsule; Refill: 0  -     amphetamine-dextroamphetamine (Adderall) 20 MG tablet; Take 1 tablet in the afternoon  Dispense: 30 tablet; Refill: 0  -     Urine Drug Screen - Urine, Clean Catch    5. Mild RAMSES not requiring CPAP    6. Family history of prostate cancer in father: Urinary hesitancy likely related to medications as this does match temporally, but if symptoms worsening or not changing with medications will do CJ at follow-up.  PSA routinely.  7. Urinary hesitancy    8. Family history of colon cancer: Colonoscopy    9. Chronic pain of left knee: Mild and early, recommend reduced impact exercise and appropriate footwear for now with observation    10. Constipation, unspecified constipation type: Recommend psyllium supplement, increased dietary fiber    11. Need for immunization against influenza  -     Fluzone >6mos ([id number])    12. Screening for malignant neoplasm of prostate  -     PSA Screen    13. Screening for blood or protein in urine  -     Urinalysis With Microscopic - Urine, Clean Catch    14. Screening for diabetes mellitus  -     Hemoglobin A1c    BMI is >= 25 and <30. (Overweight) The following options were offered after discussion;: information on healthy weight added to patient's after visit summary     Return to office in 1 year for annual physical or earlier as needed.    Venkatesh Davis MD  Family Medicine  O: [phone]    Disclaimer: Parts of this note were dictated by speech recognition. Minor errors in transcription may be present. Please call if questions.

## 2024-10-14 LAB
ALBUMIN SERPL-MCNC: 4.8 G/DL (ref 4.1–5.1)
ALP SERPL-CCNC: 73 IU/L (ref 44–121)
ALT SERPL-CCNC: 39 IU/L (ref 0–44)
AMPHETAMINES UR QL SCN: POSITIVE NG/ML
APPEARANCE UR: CLEAR
AST SERPL-CCNC: 37 IU/L (ref 0–40)
BACTERIA #/AREA URNS HPF: ABNORMAL /[HPF]
BARBITURATES UR QL SCN: NEGATIVE NG/ML
BASOPHILS # BLD AUTO: 0 X10E3/UL (ref 0–0.2)
BASOPHILS NFR BLD AUTO: 0 %
BENZODIAZ UR QL SCN: NEGATIVE NG/ML
BILIRUB SERPL-MCNC: 0.4 MG/DL (ref 0–1.2)
BILIRUB UR QL STRIP: NEGATIVE
BUN SERPL-MCNC: 15 MG/DL (ref 6–24)
BUN/CREAT SERPL: 13 (ref 9–20)
BZE UR QL SCN: NEGATIVE NG/ML
CALCIUM SERPL-MCNC: 10.1 MG/DL (ref 8.7–10.2)
CANNABINOIDS UR QL SCN: NEGATIVE NG/ML
CASTS URNS QL MICRO: ABNORMAL /LPF
CHLORIDE SERPL-SCNC: 100 MMOL/L (ref 96–106)
CHOLEST SERPL-MCNC: 228 MG/DL (ref 100–199)
CO2 SERPL-SCNC: 26 MMOL/L (ref 20–29)
COLOR UR: YELLOW
CREAT SERPL-MCNC: 1.12 MG/DL (ref 0.76–1.27)
CREAT UR-MCNC: 108.1 MG/DL (ref 20–300)
EGFRCR SERPLBLD CKD-EPI 2021: 81 ML/MIN/1.73
EOSINOPHIL # BLD AUTO: 0.2 X10E3/UL (ref 0–0.4)
EOSINOPHIL NFR BLD AUTO: 2 %
EPI CELLS #/AREA URNS HPF: ABNORMAL /HPF (ref 0–10)
ERYTHROCYTE [DISTWIDTH] IN BLOOD BY AUTOMATED COUNT: 12.8 % (ref 11.6–15.4)
GLOBULIN SER CALC-MCNC: 2.8 G/DL (ref 1.5–4.5)
GLUCOSE SERPL-MCNC: 89 MG/DL (ref 70–99)
GLUCOSE UR QL STRIP: NEGATIVE
HBA1C MFR BLD: 5.7 % (ref 4.8–5.6)
HCT VFR BLD AUTO: 48.4 % (ref 37.5–51)
HDLC SERPL-MCNC: 63 MG/DL
HGB BLD-MCNC: 15.8 G/DL (ref 13–17.7)
HGB UR QL STRIP: ABNORMAL
IMM GRANULOCYTES # BLD AUTO: 0 X10E3/UL (ref 0–0.1)
IMM GRANULOCYTES NFR BLD AUTO: 0 %
KETONES UR QL STRIP: NEGATIVE
LABORATORY COMMENT REPORT: ABNORMAL
LDLC SERPL CALC-MCNC: 131 MG/DL (ref 0–99)
LEUKOCYTE ESTERASE UR QL STRIP: NEGATIVE
LYMPHOCYTES # BLD AUTO: 2.2 X10E3/UL (ref 0.7–3.1)
LYMPHOCYTES NFR BLD AUTO: 27 %
MCH RBC QN AUTO: 30.1 PG (ref 26.6–33)
MCHC RBC AUTO-ENTMCNC: 32.6 G/DL (ref 31.5–35.7)
MCV RBC AUTO: 92 FL (ref 79–97)
METHADONE UR QL SCN: NEGATIVE NG/ML
MICRO URNS: ABNORMAL
MONOCYTES # BLD AUTO: 0.6 X10E3/UL (ref 0.1–0.9)
MONOCYTES NFR BLD AUTO: 7 %
NEUTROPHILS # BLD AUTO: 5.3 X10E3/UL (ref 1.4–7)
NEUTROPHILS NFR BLD AUTO: 64 %
NITRITE UR QL STRIP: NEGATIVE
OPIATES UR QL SCN: NEGATIVE NG/ML
OXYCODONE+OXYMORPHONE UR QL SCN: NEGATIVE NG/ML
PCP UR QL: NEGATIVE NG/ML
PH UR STRIP: 6.5 [PH] (ref 5–7.5)
PH UR: 6 [PH] (ref 4.5–8.9)
PLATELET # BLD AUTO: 257 X10E3/UL (ref 150–450)
POTASSIUM SERPL-SCNC: 4.5 MMOL/L (ref 3.5–5.2)
PROPOXYPH UR QL SCN: NEGATIVE NG/ML
PROT SERPL-MCNC: 7.6 G/DL (ref 6–8.5)
PROT UR QL STRIP: NEGATIVE
PSA SERPL-MCNC: 2.7 NG/ML (ref 0–4)
RBC # BLD AUTO: 5.25 X10E6/UL (ref 4.14–5.8)
RBC #/AREA URNS HPF: ABNORMAL /HPF (ref 0–2)
SODIUM SERPL-SCNC: 141 MMOL/L (ref 134–144)
SP GR UR STRIP: 1.02 (ref 1–1.03)
TRIGL SERPL-MCNC: 195 MG/DL (ref 0–149)
TSH SERPL DL<=0.005 MIU/L-ACNC: 1.64 UIU/ML (ref 0.45–4.5)
UROBILINOGEN UR STRIP-MCNC: 0.2 MG/DL (ref 0.2–1)
VLDLC SERPL CALC-MCNC: 34 MG/DL (ref 5–40)
WBC # BLD AUTO: 8.4 X10E3/UL (ref 3.4–10.8)
WBC #/AREA URNS HPF: ABNORMAL /HPF (ref 0–5)

## 2024-10-16 ENCOUNTER — APPOINTMENT (OUTPATIENT)
Dept: CT IMAGING | Facility: HOSPITAL | Age: 49
End: 2024-10-16
Payer: COMMERCIAL

## 2024-10-16 ENCOUNTER — HOSPITAL ENCOUNTER (OUTPATIENT)
Facility: HOSPITAL | Age: 49
Setting detail: OBSERVATION
Discharge: HOME | End: 2024-10-18
Attending: EMERGENCY MEDICINE | Admitting: EMERGENCY MEDICINE
Payer: COMMERCIAL

## 2024-10-16 DIAGNOSIS — N20.1 URETERAL STONE: Primary | ICD-10-CM

## 2024-10-16 DIAGNOSIS — N20.0 KIDNEY STONE: ICD-10-CM

## 2024-10-16 PROBLEM — R73.03 PREDIABETES: Status: ACTIVE | Noted: 2024-10-16

## 2024-10-16 LAB
ALBUMIN SERPL-MCNC: 4.5 G/DL (ref 3.5–5.2)
ALBUMIN/GLOB SERPL: 1.5 G/DL
ALP SERPL-CCNC: 68 U/L (ref 39–117)
ALT SERPL W P-5'-P-CCNC: 42 U/L (ref 1–41)
ANION GAP SERPL CALCULATED.3IONS-SCNC: 14.4 MMOL/L (ref 5–15)
ANION GAP SERPL CALCULATED.3IONS-SCNC: 9.7 MMOL/L (ref 5–15)
AST SERPL-CCNC: 35 U/L (ref 1–40)
BACTERIA UR QL AUTO: ABNORMAL /HPF
BASOPHILS # BLD AUTO: 0.05 10*3/MM3 (ref 0–0.2)
BASOPHILS NFR BLD AUTO: 0.3 % (ref 0–1.5)
BILIRUB SERPL-MCNC: 0.5 MG/DL (ref 0–1.2)
BILIRUB UR QL STRIP: NEGATIVE
BUN SERPL-MCNC: 11 MG/DL (ref 6–20)
BUN SERPL-MCNC: 16 MG/DL (ref 6–20)
BUN/CREAT SERPL: 11.8 (ref 7–25)
BUN/CREAT SERPL: 8.9 (ref 7–25)
CALCIUM SPEC-SCNC: 9.4 MG/DL (ref 8.6–10.5)
CALCIUM SPEC-SCNC: 9.9 MG/DL (ref 8.6–10.5)
CHLORIDE SERPL-SCNC: 100 MMOL/L (ref 98–107)
CHLORIDE SERPL-SCNC: 98 MMOL/L (ref 98–107)
CLARITY UR: CLEAR
CO2 SERPL-SCNC: 23.6 MMOL/L (ref 22–29)
CO2 SERPL-SCNC: 28.3 MMOL/L (ref 22–29)
COLOR UR: YELLOW
CREAT SERPL-MCNC: 1.23 MG/DL (ref 0.76–1.27)
CREAT SERPL-MCNC: 1.36 MG/DL (ref 0.76–1.27)
DEPRECATED RDW RBC AUTO: 40.9 FL (ref 37–54)
DEPRECATED RDW RBC AUTO: 42.1 FL (ref 37–54)
EGFRCR SERPLBLD CKD-EPI 2021: 64.2 ML/MIN/1.73
EGFRCR SERPLBLD CKD-EPI 2021: 72.4 ML/MIN/1.73
EOSINOPHIL # BLD AUTO: 0.07 10*3/MM3 (ref 0–0.4)
EOSINOPHIL NFR BLD AUTO: 0.4 % (ref 0.3–6.2)
ERYTHROCYTE [DISTWIDTH] IN BLOOD BY AUTOMATED COUNT: 12.7 % (ref 12.3–15.4)
ERYTHROCYTE [DISTWIDTH] IN BLOOD BY AUTOMATED COUNT: 12.7 % (ref 12.3–15.4)
GLOBULIN UR ELPH-MCNC: 3.1 GM/DL
GLUCOSE SERPL-MCNC: 115 MG/DL (ref 65–99)
GLUCOSE SERPL-MCNC: 170 MG/DL (ref 65–99)
GLUCOSE UR STRIP-MCNC: NEGATIVE MG/DL
HCT VFR BLD AUTO: 45.1 % (ref 37.5–51)
HCT VFR BLD AUTO: 45.8 % (ref 37.5–51)
HGB BLD-MCNC: 15.3 G/DL (ref 13–17.7)
HGB BLD-MCNC: 15.7 G/DL (ref 13–17.7)
HGB UR QL STRIP.AUTO: ABNORMAL
HYALINE CASTS UR QL AUTO: ABNORMAL /LPF
IMM GRANULOCYTES # BLD AUTO: 0.1 10*3/MM3 (ref 0–0.05)
IMM GRANULOCYTES NFR BLD AUTO: 0.5 % (ref 0–0.5)
KETONES UR QL STRIP: ABNORMAL
LEUKOCYTE ESTERASE UR QL STRIP.AUTO: NEGATIVE
LYMPHOCYTES # BLD AUTO: 2.45 10*3/MM3 (ref 0.7–3.1)
LYMPHOCYTES NFR BLD AUTO: 13 % (ref 19.6–45.3)
MAGNESIUM SERPL-MCNC: 2.1 MG/DL (ref 1.6–2.6)
MCH RBC QN AUTO: 29.9 PG (ref 26.6–33)
MCH RBC QN AUTO: 30.6 PG (ref 26.6–33)
MCHC RBC AUTO-ENTMCNC: 33.4 G/DL (ref 31.5–35.7)
MCHC RBC AUTO-ENTMCNC: 34.8 G/DL (ref 31.5–35.7)
MCV RBC AUTO: 87.9 FL (ref 79–97)
MCV RBC AUTO: 89.6 FL (ref 79–97)
MONOCYTES # BLD AUTO: 0.75 10*3/MM3 (ref 0.1–0.9)
MONOCYTES NFR BLD AUTO: 4 % (ref 5–12)
NEUTROPHILS NFR BLD AUTO: 15.41 10*3/MM3 (ref 1.7–7)
NEUTROPHILS NFR BLD AUTO: 81.8 % (ref 42.7–76)
NITRITE UR QL STRIP: NEGATIVE
NRBC BLD AUTO-RTO: 0 /100 WBC (ref 0–0.2)
PH UR STRIP.AUTO: 6 [PH] (ref 5–8)
PLATELET # BLD AUTO: 195 10*3/MM3 (ref 140–450)
PLATELET # BLD AUTO: 238 10*3/MM3 (ref 140–450)
PMV BLD AUTO: 9.5 FL (ref 6–12)
PMV BLD AUTO: 9.8 FL (ref 6–12)
POTASSIUM SERPL-SCNC: 3.5 MMOL/L (ref 3.5–5.2)
POTASSIUM SERPL-SCNC: 3.6 MMOL/L (ref 3.5–5.2)
PROT SERPL-MCNC: 7.6 G/DL (ref 6–8.5)
PROT UR QL STRIP: ABNORMAL
RBC # BLD AUTO: 5.11 10*6/MM3 (ref 4.14–5.8)
RBC # BLD AUTO: 5.13 10*6/MM3 (ref 4.14–5.8)
RBC # UR STRIP: ABNORMAL /HPF
REF LAB TEST METHOD: ABNORMAL
SODIUM SERPL-SCNC: 136 MMOL/L (ref 136–145)
SODIUM SERPL-SCNC: 138 MMOL/L (ref 136–145)
SP GR UR STRIP: 1.01 (ref 1–1.03)
SQUAMOUS #/AREA URNS HPF: ABNORMAL /HPF
UROBILINOGEN UR QL STRIP: ABNORMAL
WBC # UR STRIP: ABNORMAL /HPF
WBC NRBC COR # BLD AUTO: 17.68 10*3/MM3 (ref 3.4–10.8)
WBC NRBC COR # BLD AUTO: 18.83 10*3/MM3 (ref 3.4–10.8)

## 2024-10-16 PROCEDURE — 81001 URINALYSIS AUTO W/SCOPE: CPT | Performed by: EMERGENCY MEDICINE

## 2024-10-16 PROCEDURE — 80053 COMPREHEN METABOLIC PANEL: CPT | Performed by: EMERGENCY MEDICINE

## 2024-10-16 PROCEDURE — 74176 CT ABD & PELVIS W/O CONTRAST: CPT

## 2024-10-16 PROCEDURE — G0378 HOSPITAL OBSERVATION PER HR: HCPCS

## 2024-10-16 PROCEDURE — 96376 TX/PRO/DX INJ SAME DRUG ADON: CPT

## 2024-10-16 PROCEDURE — 25010000002 HYDROMORPHONE PER 4 MG: Performed by: EMERGENCY MEDICINE

## 2024-10-16 PROCEDURE — 25010000002 ONDANSETRON PER 1 MG: Performed by: EMERGENCY MEDICINE

## 2024-10-16 PROCEDURE — 96374 THER/PROPH/DIAG INJ IV PUSH: CPT

## 2024-10-16 PROCEDURE — 25810000003 SODIUM CHLORIDE 0.9 % SOLUTION 250 ML FLEX CONT: Performed by: EMERGENCY MEDICINE

## 2024-10-16 PROCEDURE — 85025 COMPLETE CBC W/AUTO DIFF WBC: CPT | Performed by: EMERGENCY MEDICINE

## 2024-10-16 PROCEDURE — 25010000002 ONDANSETRON PER 1 MG

## 2024-10-16 PROCEDURE — 96375 TX/PRO/DX INJ NEW DRUG ADDON: CPT

## 2024-10-16 PROCEDURE — 25010000002 HYDROMORPHONE 1 MG/ML SOLUTION: Performed by: EMERGENCY MEDICINE

## 2024-10-16 PROCEDURE — 83735 ASSAY OF MAGNESIUM: CPT | Performed by: EMERGENCY MEDICINE

## 2024-10-16 PROCEDURE — 25010000002 MORPHINE PER 10 MG: Performed by: EMERGENCY MEDICINE

## 2024-10-16 PROCEDURE — 25010000002 HYDROMORPHONE PER 4 MG

## 2024-10-16 PROCEDURE — 25810000003 SODIUM CHLORIDE 0.9 % SOLUTION: Performed by: EMERGENCY MEDICINE

## 2024-10-16 PROCEDURE — 99285 EMERGENCY DEPT VISIT HI MDM: CPT

## 2024-10-16 PROCEDURE — 25810000003 SODIUM CHLORIDE 0.9 % SOLUTION: Performed by: NURSE PRACTITIONER

## 2024-10-16 PROCEDURE — 25010000002 CEFTRIAXONE PER 250 MG: Performed by: NURSE PRACTITIONER

## 2024-10-16 PROCEDURE — 85027 COMPLETE CBC AUTOMATED: CPT

## 2024-10-16 PROCEDURE — 87040 BLOOD CULTURE FOR BACTERIA: CPT | Performed by: NURSE PRACTITIONER

## 2024-10-16 RX ORDER — PHENAZOPYRIDINE HYDROCHLORIDE 100 MG/1
100 TABLET, FILM COATED ORAL 3 TIMES DAILY PRN
Status: DISCONTINUED | OUTPATIENT
Start: 2024-10-16 | End: 2024-10-18 | Stop reason: HOSPADM

## 2024-10-16 RX ORDER — SODIUM CHLORIDE 0.9 % (FLUSH) 0.9 %
10 SYRINGE (ML) INJECTION AS NEEDED
Status: DISCONTINUED | OUTPATIENT
Start: 2024-10-16 | End: 2024-10-18 | Stop reason: HOSPADM

## 2024-10-16 RX ORDER — ONDANSETRON 2 MG/ML
4 INJECTION INTRAMUSCULAR; INTRAVENOUS EVERY 6 HOURS PRN
Status: DISCONTINUED | OUTPATIENT
Start: 2024-10-16 | End: 2024-10-18 | Stop reason: HOSPADM

## 2024-10-16 RX ORDER — TAMSULOSIN HYDROCHLORIDE 0.4 MG/1
0.4 CAPSULE ORAL DAILY
Status: DISCONTINUED | OUTPATIENT
Start: 2024-10-16 | End: 2024-10-18 | Stop reason: HOSPADM

## 2024-10-16 RX ORDER — DULOXETIN HYDROCHLORIDE 60 MG/1
60 CAPSULE, DELAYED RELEASE ORAL DAILY
Status: DISCONTINUED | OUTPATIENT
Start: 2024-10-16 | End: 2024-10-18 | Stop reason: HOSPADM

## 2024-10-16 RX ORDER — ONDANSETRON 2 MG/ML
4 INJECTION INTRAMUSCULAR; INTRAVENOUS ONCE
Status: COMPLETED | OUTPATIENT
Start: 2024-10-16 | End: 2024-10-16

## 2024-10-16 RX ORDER — SODIUM CHLORIDE 0.9 % (FLUSH) 0.9 %
10 SYRINGE (ML) INJECTION EVERY 12 HOURS SCHEDULED
Status: DISCONTINUED | OUTPATIENT
Start: 2024-10-16 | End: 2024-10-18 | Stop reason: HOSPADM

## 2024-10-16 RX ORDER — HYDROMORPHONE HYDROCHLORIDE 1 MG/ML
0.5 INJECTION, SOLUTION INTRAMUSCULAR; INTRAVENOUS; SUBCUTANEOUS ONCE
Status: COMPLETED | OUTPATIENT
Start: 2024-10-16 | End: 2024-10-16

## 2024-10-16 RX ORDER — AMOXICILLIN 250 MG
2 CAPSULE ORAL 2 TIMES DAILY PRN
Status: DISCONTINUED | OUTPATIENT
Start: 2024-10-16 | End: 2024-10-18 | Stop reason: HOSPADM

## 2024-10-16 RX ORDER — HYDROMORPHONE HYDROCHLORIDE 1 MG/ML
0.5 INJECTION, SOLUTION INTRAMUSCULAR; INTRAVENOUS; SUBCUTANEOUS
Status: DISCONTINUED | OUTPATIENT
Start: 2024-10-16 | End: 2024-10-18 | Stop reason: HOSPADM

## 2024-10-16 RX ORDER — BISACODYL 10 MG
10 SUPPOSITORY, RECTAL RECTAL DAILY PRN
Status: DISCONTINUED | OUTPATIENT
Start: 2024-10-16 | End: 2024-10-18 | Stop reason: HOSPADM

## 2024-10-16 RX ORDER — MORPHINE SULFATE 2 MG/ML
4 INJECTION, SOLUTION INTRAMUSCULAR; INTRAVENOUS ONCE
Status: COMPLETED | OUTPATIENT
Start: 2024-10-16 | End: 2024-10-16

## 2024-10-16 RX ORDER — ONDANSETRON 4 MG/1
4 TABLET, ORALLY DISINTEGRATING ORAL EVERY 6 HOURS PRN
Status: DISCONTINUED | OUTPATIENT
Start: 2024-10-16 | End: 2024-10-18 | Stop reason: HOSPADM

## 2024-10-16 RX ORDER — ACETAMINOPHEN 325 MG/1
650 TABLET ORAL EVERY 4 HOURS PRN
Status: DISCONTINUED | OUTPATIENT
Start: 2024-10-16 | End: 2024-10-18 | Stop reason: HOSPADM

## 2024-10-16 RX ORDER — HYDROMORPHONE HYDROCHLORIDE 1 MG/ML
0.5 INJECTION, SOLUTION INTRAMUSCULAR; INTRAVENOUS; SUBCUTANEOUS EVERY 4 HOURS PRN
Status: DISCONTINUED | OUTPATIENT
Start: 2024-10-16 | End: 2024-10-16

## 2024-10-16 RX ORDER — BISACODYL 5 MG/1
5 TABLET, DELAYED RELEASE ORAL DAILY PRN
Status: DISCONTINUED | OUTPATIENT
Start: 2024-10-16 | End: 2024-10-18 | Stop reason: HOSPADM

## 2024-10-16 RX ORDER — METHOCARBAMOL 750 MG/1
750 TABLET, FILM COATED ORAL EVERY 8 HOURS PRN
Status: DISCONTINUED | OUTPATIENT
Start: 2024-10-16 | End: 2024-10-18 | Stop reason: HOSPADM

## 2024-10-16 RX ORDER — ACETAMINOPHEN 160 MG/5ML
650 SOLUTION ORAL EVERY 4 HOURS PRN
Status: DISCONTINUED | OUTPATIENT
Start: 2024-10-16 | End: 2024-10-18 | Stop reason: HOSPADM

## 2024-10-16 RX ORDER — HYDROCODONE BITARTRATE AND ACETAMINOPHEN 7.5; 325 MG/1; MG/1
1 TABLET ORAL EVERY 6 HOURS PRN
Status: DISCONTINUED | OUTPATIENT
Start: 2024-10-16 | End: 2024-10-18 | Stop reason: HOSPADM

## 2024-10-16 RX ORDER — POLYETHYLENE GLYCOL 3350 17 G/17G
17 POWDER, FOR SOLUTION ORAL DAILY PRN
Status: DISCONTINUED | OUTPATIENT
Start: 2024-10-16 | End: 2024-10-18 | Stop reason: HOSPADM

## 2024-10-16 RX ORDER — ACETAMINOPHEN 650 MG/1
650 SUPPOSITORY RECTAL EVERY 4 HOURS PRN
Status: DISCONTINUED | OUTPATIENT
Start: 2024-10-16 | End: 2024-10-18 | Stop reason: HOSPADM

## 2024-10-16 RX ORDER — SODIUM CHLORIDE 9 MG/ML
100 INJECTION, SOLUTION INTRAVENOUS CONTINUOUS
Status: DISCONTINUED | OUTPATIENT
Start: 2024-10-16 | End: 2024-10-16

## 2024-10-16 RX ADMIN — HYDROMORPHONE HYDROCHLORIDE 0.5 MG: 1 INJECTION, SOLUTION INTRAMUSCULAR; INTRAVENOUS; SUBCUTANEOUS at 03:51

## 2024-10-16 RX ADMIN — Medication 10 ML: at 20:39

## 2024-10-16 RX ADMIN — METHOCARBAMOL 750 MG: 750 TABLET ORAL at 19:39

## 2024-10-16 RX ADMIN — SODIUM CHLORIDE 500 ML: 9 INJECTION, SOLUTION INTRAVENOUS at 02:41

## 2024-10-16 RX ADMIN — HYDROMORPHONE HYDROCHLORIDE 0.5 MG: 1 INJECTION, SOLUTION INTRAMUSCULAR; INTRAVENOUS; SUBCUTANEOUS at 14:49

## 2024-10-16 RX ADMIN — HYDROCODONE BITARTRATE AND ACETAMINOPHEN 1 TABLET: 7.5; 325 TABLET ORAL at 14:09

## 2024-10-16 RX ADMIN — HYDROMORPHONE HYDROCHLORIDE 0.5 MG: 1 INJECTION, SOLUTION INTRAMUSCULAR; INTRAVENOUS; SUBCUTANEOUS at 02:46

## 2024-10-16 RX ADMIN — METHOCARBAMOL 750 MG: 750 TABLET ORAL at 12:10

## 2024-10-16 RX ADMIN — SODIUM CHLORIDE 2000 MG: 900 INJECTION INTRAVENOUS at 10:16

## 2024-10-16 RX ADMIN — LIDOCAINE HYDROCHLORIDE 100 MG: 10 INJECTION, SOLUTION EPIDURAL; INFILTRATION; INTRACAUDAL; PERINEURAL at 04:35

## 2024-10-16 RX ADMIN — MORPHINE SULFATE 4 MG: 2 INJECTION, SOLUTION INTRAMUSCULAR; INTRAVENOUS at 02:19

## 2024-10-16 RX ADMIN — HYDROMORPHONE HYDROCHLORIDE 0.5 MG: 1 INJECTION, SOLUTION INTRAMUSCULAR; INTRAVENOUS; SUBCUTANEOUS at 06:40

## 2024-10-16 RX ADMIN — TAMSULOSIN HYDROCHLORIDE 0.4 MG: 0.4 CAPSULE ORAL at 05:36

## 2024-10-16 RX ADMIN — HYDROMORPHONE HYDROCHLORIDE 1 MG: 1 INJECTION, SOLUTION INTRAMUSCULAR; INTRAVENOUS; SUBCUTANEOUS at 17:30

## 2024-10-16 RX ADMIN — HYDROMORPHONE HYDROCHLORIDE 0.5 MG: 1 INJECTION, SOLUTION INTRAMUSCULAR; INTRAVENOUS; SUBCUTANEOUS at 10:20

## 2024-10-16 RX ADMIN — ONDANSETRON 4 MG: 2 INJECTION, SOLUTION INTRAMUSCULAR; INTRAVENOUS at 12:16

## 2024-10-16 RX ADMIN — HYDROCODONE BITARTRATE AND ACETAMINOPHEN 1 TABLET: 7.5; 325 TABLET ORAL at 08:00

## 2024-10-16 RX ADMIN — ONDANSETRON 4 MG: 2 INJECTION, SOLUTION INTRAMUSCULAR; INTRAVENOUS at 02:18

## 2024-10-16 RX ADMIN — DULOXETINE HYDROCHLORIDE 60 MG: 60 CAPSULE, DELAYED RELEASE ORAL at 08:01

## 2024-10-16 RX ADMIN — Medication 10 ML: at 08:04

## 2024-10-16 RX ADMIN — HYDROMORPHONE HYDROCHLORIDE 0.5 MG: 1 INJECTION, SOLUTION INTRAMUSCULAR; INTRAVENOUS; SUBCUTANEOUS at 20:52

## 2024-10-16 RX ADMIN — SODIUM CHLORIDE 500 ML: 9 INJECTION, SOLUTION INTRAVENOUS at 08:03

## 2024-10-16 NOTE — ED NOTES
"Nursing report ED to floor  John Roy  49 y.o.  male    HPI :  HPI  Stated Reason for Visit: right flank pain radiates to front, no history of kidney stone - states he can't urinate  History Obtained From: patient    Chief Complaint  Chief Complaint   Patient presents with    Flank Pain       Admitting doctor:   Verenice Gold MD    Admitting diagnosis:   The encounter diagnosis was Ureteral stone.    Code status:   Current Code Status       Date Active Code Status Order ID Comments User Context       10/16/2024 0511 CPR (Attempt to Resuscitate) [id number]  Samina Castañeda APRN ED        Question Answer    Code Status (Patient has no pulse and is not breathing) CPR (Attempt to Resuscitate)    Medical Interventions (Patient has pulse or is breathing) Full Support                    Allergies:   Patient has no known allergies.    Isolation:   No active isolations    Intake and Output    Intake/Output Summary (Last 24 hours) at 10/16/2024 0519  Last data filed at 10/16/2024 0328  Gross per 24 hour   Intake 500 ml   Output --   Net 500 ml       Weight:       10/16/24  0147   Weight: 74.8 kg (165 lb)       Most recent vitals:   Vitals:    10/16/24 0147 10/16/24 0231   BP: 120/78 129/92   BP Location: Right arm    Patient Position: Sitting    Pulse: 81 72   Resp: 20    Temp: 96.8 °F (36 °C)    TempSrc: Tympanic    SpO2: 100% 99%   Weight: 74.8 kg (165 lb)    Height: 172.7 cm (68\")        Active LDAs/IV Access:   Lines, Drains & Airways       Active LDAs       Name Placement date Placement time Site Days    Peripheral IV 10/16/24 0217 Right Antecubital 10/16/24  0217  Antecubital  less than 1                    Labs (abnormal labs have a star):   Labs Reviewed   COMPREHENSIVE METABOLIC PANEL - Abnormal; Notable for the following components:       Result Value    Glucose 170 (*)     Creatinine 1.36 (*)     ALT (SGPT) 42 (*)     All other components within normal limits    Narrative:     GFR Normal >60  Chronic " Kidney Disease <60  Kidney Failure <15     URINALYSIS W/ MICROSCOPIC IF INDICATED (NO CULTURE) - Abnormal; Notable for the following components:    Ketones, UA 40 mg/dL (2+) (*)     Blood, UA Large (3+) (*)     Protein, UA Trace (*)     All other components within normal limits   CBC WITH AUTO DIFFERENTIAL - Abnormal; Notable for the following components:    WBC 18.83 (*)     Neutrophil % 81.8 (*)     Lymphocyte % 13.0 (*)     Monocyte % 4.0 (*)     Neutrophils, Absolute 15.41 (*)     Immature Grans, Absolute 0.10 (*)     All other components within normal limits   URINALYSIS, MICROSCOPIC ONLY - Abnormal; Notable for the following components:    RBC, UA Too Numerous to Count (*)     All other components within normal limits   MAGNESIUM - Normal   CBC (NO DIFF)   CBC AND DIFFERENTIAL    Narrative:     The following orders were created for panel order CBC & Differential.  Procedure                               Abnormality         Status                     ---------                               -----------         ------                     CBC Auto Differential[[id number]]        Abnormal            Final result                 Please view results for these tests on the individual orders.       EKG:   No orders to display       Meds given in ED:   Medications   sodium chloride 0.9 % flush 10 mL (has no administration in time range)   sodium chloride 0.9 % flush 10 mL (has no administration in time range)   sodium chloride 0.9 % flush 10 mL (has no administration in time range)   ondansetron ODT (ZOFRAN-ODT) disintegrating tablet 4 mg (has no administration in time range)     Or   ondansetron (ZOFRAN) injection 4 mg (has no administration in time range)   Potassium Replacement - Follow Nurse / BPA Driven Protocol (has no administration in time range)   Magnesium Standard Dose Replacement - Follow Nurse / BPA Driven Protocol (has no administration in time range)   Phosphorus Replacement - Follow Nurse / BPA Driven  Protocol (has no administration in time range)   Calcium Replacement - Follow Nurse / BPA Driven Protocol (has no administration in time range)   acetaminophen (TYLENOL) tablet 650 mg (has no administration in time range)     Or   acetaminophen (TYLENOL) 160 MG/5ML oral solution 650 mg (has no administration in time range)     Or   acetaminophen (TYLENOL) suppository 650 mg (has no administration in time range)   sennosides-docusate (PERICOLACE) 8.6-50 MG per tablet 2 tablet (has no administration in time range)     And   polyethylene glycol (MIRALAX) packet 17 g (has no administration in time range)     And   bisacodyl (DULCOLAX) EC tablet 5 mg (has no administration in time range)     And   bisacodyl (DULCOLAX) suppository 10 mg (has no administration in time range)   tamsulosin (FLOMAX) 24 hr capsule 0.4 mg (has no administration in time range)   sodium chloride 0.9 % bolus 500 mL (0 mL Intravenous Stopped 10/16/24 0328)   morphine injection 4 mg (4 mg Intravenous Given 10/16/24 0219)   ondansetron (ZOFRAN) injection 4 mg (4 mg Intravenous Given 10/16/24 0218)   HYDROmorphone (DILAUDID) injection 0.5 mg (0.5 mg Intravenous Given 10/16/24 0246)   HYDROmorphone (DILAUDID) injection 0.5 mg (0.5 mg Intravenous Given 10/16/24 0351)   lidocaine (XYLOCAINE) 1 % 100 mg in sodium chloride 0.9 % 250 mL IVPB (100 mg Intravenous Given 10/16/24 0435)       Imaging results:  CT Abdomen Pelvis Without Contrast    Result Date: 10/16/2024  Mild right-sided hydronephrosis, secondary to a 3 mm stone within the proximal right ureter.  Radiation dose reduction techniques were utilized, including automated exposure control and exposure modulation based on body size.   This report was finalized on 10/16/2024 3:44 AM by Dr. Shantelle White M.D on Workstation: [id number]       Ambulatory status:   - Ad Alexandrea    Social issues:   Social History     Socioeconomic History    Marital status:    Tobacco Use    Smoking status: Former      Current packs/day: 0.00     Types: Cigarettes     Quit date: 1996     Years since quittin.8    Smokeless tobacco: Former   Vaping Use    Vaping status: Never Used   Substance and Sexual Activity    Alcohol use: Yes     Alcohol/week: 2.0 standard drinks of alcohol     Types: 2 Cans of beer per week     Comment: socially    Drug use: No    Sexual activity: Yes     Partners: Female     Birth control/protection: None, Surgical       Peripheral Neurovascular  Peripheral Neurovascular (Adult)  Peripheral Neurovascular WDL: WDL    Neuro Cognitive  Neuro Cognitive (Adult)  Cognitive/Neuro/Behavioral WDL: WDL, orientation  Orientation: oriented x 4    Learning  Learning Assessment  Learning Readiness and Ability: no barriers identified  Education Provided  Person Taught: patient, family member/friend  Teaching Method: verbal instruction  Teaching Focus: symptom/problem overview  Education Outcome Evaluation: verbalizes understanding    Respiratory  Respiratory  Airway WDL: WDL  Respiratory WDL  Respiratory WDL: WDL    Abdominal Pain       Pain Assessments  Pain (Adult)  (0-10) Pain Rating: Rest: 9  (0-10) Pain Rating: Activity: 9  Pain Location: abdomen, flank  Pain Side/Orientation: right  Response to Pain Interventions: interventions effective per patient, nonverbal indicators absent/decreased    NIH Stroke Scale       Mary Campbell RN  10/16/24 05:19 EDT

## 2024-10-16 NOTE — PLAN OF CARE
Goal Outcome Evaluation:      Pt is a 48 yo male admitted to the obs unit for a 3 mm right ureteral kidney stone. Pt pain managed per order guidelines. Urine strainer at bedside. Pt is A&O x4. RA. Up ad gamaliel. NPO sips w/ meds. Urology consulted. Pt has no further questions or complaints at this time.

## 2024-10-16 NOTE — ED PROVIDER NOTES
EMERGENCY DEPARTMENT ENCOUNTER    Room Number:  131/1  PCP: Venkatesh Davis MD  Patient Care Team:  Venkatesh Davis MD as PCP - General (Family Medicine)   Independent Historians: Patient    HPI:  Chief Complaint: Right flank pain    A complete HPI/ROS/PMH/PSH/SH/FH are unobtainable due to: None    Chronic or social conditions impacting patient care (Social Determinants of Health): None  (Financial Resource Strain / Food Insecurity / Transportation Needs / Physical Activity / Stress / Social Connections / Intimate Partner Violence / Housing Stability)    Context: John Roy is a 49 y.o. male who presents to the ED c/o acute right flank pain that started around 10 PM.  Patient started suddenly.  Radiates down to his right lower quadrant.  Patient denies any vomiting but states he is felt nauseous.  Denies any hematuria or dysuria.  Denies any prior similar episodes.  Has never had a kidney stone.    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 10/02/2018    Mixed hyperlipidemia 08/13/2020    Colon polyps 08/13/2020    Family history of prostate cancer in father 12/01/2020    Sebaceous cyst 07/26/2022    Synovial cyst 07/26/2022    Generalized anxiety disorder 07/26/2022    Attention deficit disorder 02/06/2023    Family history of colon cancer 08/03/2023    Urinary hesitancy 10/11/2024    Chronic pain of left knee 10/11/2024    Constipation 10/11/2024     Resolved Ambulatory Problems     Diagnosis Date Noted    Viral gastritis 08/29/2017    Viral illness 08/15/2018    Epigastric discomfort 08/13/2020    Chronic foot pain, left 08/13/2020    Left cervical radiculopathy 10/06/2022     Past Medical History:   Diagnosis Date    ADHD  (attention deficit hyperactivity disorder) 24    Anxiety 24    Colon polyp 2009    H/O removal of neck cyst 2023         PAST SURGICAL HISTORY  Past Surgical History:   Procedure Laterality Date    COLONOSCOPY      COLONOSCOPY N/A 2023    Procedure: COLONOSCOPY to cecum with hot snare polypectomy;  Surgeon: John Lechuga Jr., MD;  Location: [facility] ENDOSCOPY;  Service: General;  Laterality: N/A;  Pre: hx polyps, family hx colon cancer  Post: polyp    VASECTOMY  2016         FAMILY HISTORY  Family History   Problem Relation Age of Onset    Cancer Mother     Early death Mother         Age 54    Learning disabilities Mother     Prostate cancer Father     Cancer Father         Prostate Cancer    COPD Father     Anxiety disorder Father     Colon cancer Father     Colon cancer Paternal Uncle     Depression Maternal Grandmother     Colon cancer Paternal Grandmother     Malig Hyperthermia Neg Hx          SOCIAL HISTORY  Social History     Socioeconomic History    Marital status:    Tobacco Use    Smoking status: Former     Current packs/day: 0.00     Types: Cigarettes     Quit date: 1996     Years since quittin.8    Smokeless tobacco: Former   Vaping Use    Vaping status: Never Used   Substance and Sexual Activity    Alcohol use: Yes     Alcohol/week: 2.0 standard drinks of alcohol     Types: 2 Cans of beer per week     Comment: socially    Drug use: No    Sexual activity: Yes     Partners: Female     Birth control/protection: None, Surgical         ALLERGIES  Patient has no known allergies.        REVIEW OF SYSTEMS  Review of Systems  Included in HPI  All systems reviewed and negative except for those discussed in HPI.      PHYSICAL EXAM    I have reviewed the triage vital signs and nursing notes.    ED Triage Vitals [10/16/24 0147]   Temp Heart Rate Resp BP SpO2   96.8 °F (36 °C) 81 20 120/78 100 %      Temp src Heart Rate Source Patient Position BP Location FiO2 (%)   Tympanic  Monitor Sitting Right arm --       Physical Exam  GENERAL: alert, moderate acute distress  SKIN: Warm, dry  HENT: Normocephalic, atraumatic  EYES: no scleral icterus  CV: regular rhythm, regular rate  RESPIRATORY: normal effort, lungs clear  ABDOMEN: soft, tender palpation over right flank and right lower quadrant guarding but no rebound, nondistended  MUSCULOSKELETAL: no deformity  NEURO: alert, moves all extremities, follows commands                                                               LAB RESULTS  Recent Results (from the past 24 hours)   Comprehensive Metabolic Panel    Collection Time: 10/16/24  2:17 AM    Specimen: Blood   Result Value Ref Range    Glucose 170 (H) 65 - 99 mg/dL    BUN 16 6 - 20 mg/dL    Creatinine 1.36 (H) 0.76 - 1.27 mg/dL    Sodium 138 136 - 145 mmol/L    Potassium 3.5 3.5 - 5.2 mmol/L    Chloride 100 98 - 107 mmol/L    CO2 23.6 22.0 - 29.0 mmol/L    Calcium 9.9 8.6 - 10.5 mg/dL    Total Protein 7.6 6.0 - 8.5 g/dL    Albumin 4.5 3.5 - 5.2 g/dL    ALT (SGPT) 42 (H) 1 - 41 U/L    AST (SGOT) 35 1 - 40 U/L    Alkaline Phosphatase 68 39 - 117 U/L    Total Bilirubin 0.5 0.0 - 1.2 mg/dL    Globulin 3.1 gm/dL    A/G Ratio 1.5 g/dL    BUN/Creatinine Ratio 11.8 7.0 - 25.0    Anion Gap 14.4 5.0 - 15.0 mmol/L    eGFR 64.2 >60.0 mL/min/1.73   Magnesium    Collection Time: 10/16/24  2:17 AM    Specimen: Blood   Result Value Ref Range    Magnesium 2.1 1.6 - 2.6 mg/dL   CBC Auto Differential    Collection Time: 10/16/24  2:17 AM    Specimen: Blood   Result Value Ref Range    WBC 18.83 (H) 3.40 - 10.80 10*3/mm3    RBC 5.13 4.14 - 5.80 10*6/mm3    Hemoglobin 15.7 13.0 - 17.7 g/dL    Hematocrit 45.1 37.5 - 51.0 %    MCV 87.9 79.0 - 97.0 fL    MCH 30.6 26.6 - 33.0 pg    MCHC 34.8 31.5 - 35.7 g/dL    RDW 12.7 12.3 - 15.4 %    RDW-SD 40.9 37.0 - 54.0 fl    MPV 9.8 6.0 - 12.0 fL    Platelets 238 140 - 450 10*3/mm3    Neutrophil % 81.8 (H) 42.7 - 76.0 %    Lymphocyte % 13.0 (L) 19.6 - 45.3 %    Monocyte %  4.0 (L) 5.0 - 12.0 %    Eosinophil % 0.4 0.3 - 6.2 %    Basophil % 0.3 0.0 - 1.5 %    Immature Grans % 0.5 0.0 - 0.5 %    Neutrophils, Absolute 15.41 (H) 1.70 - 7.00 10*3/mm3    Lymphocytes, Absolute 2.45 0.70 - 3.10 10*3/mm3    Monocytes, Absolute 0.75 0.10 - 0.90 10*3/mm3    Eosinophils, Absolute 0.07 0.00 - 0.40 10*3/mm3    Basophils, Absolute 0.05 0.00 - 0.20 10*3/mm3    Immature Grans, Absolute 0.10 (H) 0.00 - 0.05 10*3/mm3    nRBC 0.0 0.0 - 0.2 /100 WBC   Urinalysis With Microscopic If Indicated (No Culture) - Urine, Clean Catch    Collection Time: 10/16/24  4:35 AM    Specimen: Urine, Clean Catch   Result Value Ref Range    Color, UA Yellow Yellow, Straw    Appearance, UA Clear Clear    pH, UA 6.0 5.0 - 8.0    Specific Gravity, UA 1.015 1.005 - 1.030    Glucose, UA Negative Negative    Ketones, UA 40 mg/dL (2+) (A) Negative    Bilirubin, UA Negative Negative    Blood, UA Large (3+) (A) Negative    Protein, UA Trace (A) Negative    Leuk Esterase, UA Negative Negative    Nitrite, UA Negative Negative    Urobilinogen, UA 1.0 E.U./dL 0.2 - 1.0 E.U./dL   Urinalysis, Microscopic Only - Urine, Clean Catch    Collection Time: 10/16/24  4:35 AM    Specimen: Urine, Clean Catch   Result Value Ref Range    RBC, UA Too Numerous to Count (A) None Seen, 0-2 /HPF    WBC, UA 0-2 None Seen, 0-2 /HPF    Bacteria, UA None Seen None Seen /HPF    Squamous Epithelial Cells, UA 0-2 None Seen, 0-2 /HPF    Hyaline Casts, UA None Seen None Seen /LPF    Methodology Automated Microscopy        I ordered the above labs and independently reviewed the results.        RADIOLOGY  CT Abdomen Pelvis Without Contrast    Result Date: 10/16/2024  CT OF THE ABDOMEN PELVIS WITHOUT CONTRAST  HISTORY: Right flank pain  COMPARISON: None available.  TECHNIQUE: Axial CT imaging was obtained through the abdomen and pelvis. No IV contrast was administered.  FINDINGS: Images through the lung bases are clear. No suspicious hepatic lesions are seen. The  spleen adrenal glands, pancreas, and gallbladder are normal. Stomach and duodenum are probably within normal limits. The patient has mild right-sided hydronephrosis. This is secondary to a 3 mm stone, which is located within the proximal right ureter. There are additional nonobstructing stones noted within both kidneys. Single largest on the right measures up to 6 mm. Single largest on the left measures 3 mm. No distal ureteral or bladder stones are seen. Prostate gland contains dystrophic calcifications. There is colonic diverticulosis. There is no bowel obstruction. The appendix is normal. There is a small fat-containing umbilical hernia. No acute osseous abnormalities are seen.       Mild right-sided hydronephrosis, secondary to a 3 mm stone within the proximal right ureter.  Radiation dose reduction techniques were utilized, including automated exposure control and exposure modulation based on body size.   This report was finalized on 10/16/2024 3:44 AM by Dr. Shantelle White M.D on Workstation: [id number]       I ordered the above noted radiological studies. Reviewed by me and discussed with radiologist.  See dictation for official radiology interpretation.      PROCEDURES    Procedures      MEDICATIONS GIVEN IN ER    Medications   sodium chloride 0.9 % flush 10 mL (has no administration in time range)   sodium chloride 0.9 % flush 10 mL (has no administration in time range)   sodium chloride 0.9 % flush 10 mL (has no administration in time range)   ondansetron ODT (ZOFRAN-ODT) disintegrating tablet 4 mg (has no administration in time range)     Or   ondansetron (ZOFRAN) injection 4 mg (has no administration in time range)   Potassium Replacement - Follow Nurse / BPA Driven Protocol (has no administration in time range)   Magnesium Standard Dose Replacement - Follow Nurse / BPA Driven Protocol (has no administration in time range)   Phosphorus Replacement - Follow Nurse / BPA Driven Protocol (has no  administration in time range)   Calcium Replacement - Follow Nurse / BPA Driven Protocol (has no administration in time range)   acetaminophen (TYLENOL) tablet 650 mg (has no administration in time range)     Or   acetaminophen (TYLENOL) 160 MG/5ML oral solution 650 mg (has no administration in time range)     Or   acetaminophen (TYLENOL) suppository 650 mg (has no administration in time range)   sennosides-docusate (PERICOLACE) 8.6-50 MG per tablet 2 tablet (has no administration in time range)     And   polyethylene glycol (MIRALAX) packet 17 g (has no administration in time range)     And   bisacodyl (DULCOLAX) EC tablet 5 mg (has no administration in time range)     And   bisacodyl (DULCOLAX) suppository 10 mg (has no administration in time range)   tamsulosin (FLOMAX) 24 hr capsule 0.4 mg (0.4 mg Oral Given 10/16/24 0536)   HYDROmorphone (DILAUDID) injection 0.5 mg (has no administration in time range)   HYDROcodone-acetaminophen (NORCO) 7.5-325 MG per tablet 1 tablet (has no administration in time range)   sodium chloride 0.9 % bolus 500 mL (0 mL Intravenous Stopped 10/16/24 0328)   morphine injection 4 mg (4 mg Intravenous Given 10/16/24 0219)   ondansetron (ZOFRAN) injection 4 mg (4 mg Intravenous Given 10/16/24 0218)   HYDROmorphone (DILAUDID) injection 0.5 mg (0.5 mg Intravenous Given 10/16/24 0246)   HYDROmorphone (DILAUDID) injection 0.5 mg (0.5 mg Intravenous Given 10/16/24 0351)   lidocaine (XYLOCAINE) 1 % 100 mg in sodium chloride 0.9 % 250 mL IVPB (100 mg Intravenous Given 10/16/24 0435)         ORDERS PLACED DURING THIS VISIT:  Orders Placed This Encounter   Procedures    CT Abdomen Pelvis Without Contrast    Comprehensive Metabolic Panel    Urinalysis With Microscopic If Indicated (No Culture) - Urine, Clean Catch    Magnesium    CBC Auto Differential    Urinalysis, Microscopic Only - Urine, Clean Catch    Basic Metabolic Panel    CBC (No Diff)    NPO Diet NPO Type: Sips with Meds    Monitor Blood  Pressure    Intake & Output    Weigh Patient    Oral Care    Saline Lock & Maintain IV Access    Vital Signs    Continuous Pulse Oximetry    Activity - Ad Alexandrea    Strain All Urine    Code Status and Medical Interventions: CPR (Attempt to Resuscitate); Full Support    Urology (on-call MD unless specified)    Inpatient Urology Consult    Insert Peripheral IV    Insert Peripheral IV    Initiate ED Observation Status    CBC & Differential         PROGRESS, DATA ANALYSIS, CONSULTS, AND MEDICAL DECISION MAKING    All labs have been independently interpreted by me.  All radiology studies have been reviewed by me and discussed with radiologist dictating the report.   EKG's independently viewed and interpreted by me.  Discussion below represents my analysis of pertinent findings related to patient's condition, differential diagnosis, treatment plan and final disposition.    Differential diagnosis includes but is not limited to:  Gastritis, gastroenteritis, peptic ulcer disease, GERD, esophageal perforation, acute appendicitis, mesenteric adenitis, Meckel’s diverticulum, epiploic appendagitis, diverticulitis, colon cancer, ulcerative colitis, Crohn’s disease, intussusception, small bowel obstruction, adhesions, ischemic bowel, perforated viscus, ileus, obstipation, biliary colic, cholecystitis, cholelithiasis, hepatitis, pancreatitis, common bile duct obstruction, cholangitis, bile leak, splenic trauma, splenic rupture, splenic infarction, splenic abscess, abdominal abscess, ascites, spontaneous bacterial peritonitis, hernia, UTI, cystitis, prostatitis, ureterolithiasis, urinary obstruction, testicular torsion, AAA, myocardial infarction, pneumonia, cancer, porphyria, DKA, medications, sickle cell, viral syndrome, zoster  .    Clinical Scores:              ED Course as of 10/16/24 0613   Wed Oct 16, 2024   0237 WBC(!): 18.83 [TJ]   0237 Hemoglobin: 15.7 [TJ]   0237 Platelets: 238 [TJ]   0413 I was advised the patient is a  relatively uncomfortable will give a lidocaine infusion.  Awaiting urine. [TJ]   0507 Discussed with Obs Unit.  Will admit. [TJ]   0508 Page placed to Urology. [TJ]   5388 Discussed with Dr. Weaver, urology, happy to consult. [TJ]      ED Course User Index  [TJ] Darshan Chacon MD               PPE: The patient wore a mask and I wore an N95 mask throughout the entire patient encounter.       AS OF 06:13 EDT VITALS:    BP - 129/92  HR - 72  TEMP - 96.8 °F (36 °C) (Tympanic)  O2 SATS - 99%        DIAGNOSIS  Final diagnoses:   Ureteral stone         DISPOSITION  ED Disposition       ED Disposition   Decision to Admit    Condition   --    Comment   --                  Note Disclaimer: At [facility], we believe that sharing information builds trust and better relationships. You are receiving this note because you recently visited [facility]. It is possible you will see health information before a provider has talked with you about it. This kind of information can be easy to misunderstand. To help you fully understand what it means for your health, we urge you to discuss this note with your provider.         Darshan Chacon MD  10/16/24 7116

## 2024-10-16 NOTE — H&P
[facility]   HISTORY AND PHYSICAL    Patient Name: John Roy  : 1975  MRN: [id number]  Primary Care Physician:  Venkatesh Davis MD  Date of admission: 10/16/2024    Subjective   Subjective     Chief Complaint:   Chief Complaint   Patient presents with    Flank Pain         HPI:    John Roy is a 49 y.o. male, with a past medical history including, but not limited to, ADHD, anxiety, RAMSES not requiring CPAP, presented to the emergency department with a sudden onset of right sided flank pain that began around 10 PM.  He states that he has had nausea but no vomiting.  He denies any hematuria, frequency, or dysuria.  He denies  having a kidney stone previously.  Urology has been consulted to see the patient this a.m.  He will remain n.p.o.    Review of Systems   All systems were reviewed and negative except for: What was mentioned above in the HPI.    Personal History     Past Medical History:   Diagnosis Date    ADHD (attention deficit hyperactivity disorder) 23    Anxiety 23    Chronic foot pain, left 2020    Colon polyp 2009    Epigastric discomfort 2020    H/O removal of neck cyst 2023    Left cervical radiculopathy 10/06/2022    Viral gastritis 2017       Past Surgical History:   Procedure Laterality Date    COLONOSCOPY      COLONOSCOPY N/A 2023    Procedure: COLONOSCOPY to cecum with hot snare polypectomy;  Surgeon: John Lechuga Jr., MD;  Location: [facility] ENDOSCOPY;  Service: General;  Laterality: N/A;  Pre: hx polyps, family hx colon cancer  Post: polyp    VASECTOMY         Family History: family history includes Anxiety disorder in his father; COPD in his father; Cancer in his father and mother; Colon cancer in his father, paternal grandmother, and paternal uncle; Depression in his maternal grandmother; Early death in his mother; Learning disabilities in his mother; Prostate cancer in his father. Otherwise pertinent FHx was reviewed and  not pertinent to current issue.    Social History:  reports that he quit smoking about 28 years ago. His smoking use included cigarettes. He has quit using smokeless tobacco. He reports current alcohol use of about 2.0 standard drinks of alcohol per week. He reports that he does not use drugs.    Home Medications:  DULoxetine, amphetamine-dextroamphetamine, and amphetamine-dextroamphetamine XR    Allergies:  No Known Allergies    Objective   Objective     Vitals:   Temp:  [96.8 °F (36 °C)] 96.8 °F (36 °C)  Heart Rate:  [72-81] 72  Resp:  [20] 20  BP: (120-129)/(78-92) 129/92  Physical Exam   Constitutional: Awake, alert   Eyes: PERRLA   HENT: NCAT, mucous membranes moist   Neck: Supple   Respiratory: Clear to auscultation bilaterally, nonlabored respirations    Cardiovascular: regular rate, palpable pedal pulses bilaterally   Gastrointestinal: Positive bowel sounds, soft, nontender, nondistended   Musculoskeletal: No bilateral ankle edema   Psychiatric: Appropriate affect, cooperative   Neurologic: Oriented x 3, speech clear   Skin: No rashes       Result Review    Result Review:  I have personally reviewed the results from the time of this admission to 10/16/2024 05:12 EDT and agree with these findings:  [x]  Laboratory list / accordion  []  Microbiology  [x]  Radiology  []  EKG/Telemetry   []  Cardiology/Vascular   []  Pathology  [x]  Old records  []  Other:      Initial lab workup in the emergency department shows a creatinine of 1.36, glucose 170, ALT 42, WBCs 18.83.  All of the lab work is at baseline for the patient.  Urinalysis shows no signs of infection.  CT abdomen pelvis without contrast shows mild right-sided hydronephrosis secondary to 3 mm stone within the right proximal ureter.    Assessment & Plan   Assessment / Plan     Brief Patient Summary:  John Roy is a 49 y.o. male who was admitted for further evaluation and treatment of his right proximal ureter kidney stone.    Active Hospital  Problems:  Active Hospital Problems    Diagnosis     **Kidney stone      Plan:   Kidney stone:  -Urology consult  -Pain control  -Vital signs every 4 hours  -N.p.o.  -As needed antiemetic  -Flomax 0.4 mg po daily  -Strain all urine    Anxiety  -Continue duloxetine-Home dose      VTE Prophylaxis:  Mechanical VTE prophylaxis orders are present.        CODE STATUS:    Code Status (Patient has no pulse and is not breathing): CPR (Attempt to Resuscitate)  Medical Interventions (Patient has pulse or is breathing): Full Support    Admission Status:  I believe this patient meets observation status.    76 minutes have been spent by [facility] providers in the care of this patient while under observation status.      Appropriate PPE worn during patient encounter.  Hand hygeine performed before and after seeing the patient.      Electronically signed by GERSON Townsend, 10/16/24, 5:12 AM EDT.

## 2024-10-16 NOTE — PROGRESS NOTES
ED OBSERVATION PROGRESS/DISCHARGE SUMMARY    Date of Admission: 10/16/2024   LOS: 0 days   PCP: Venkatesh Davis MD    Subjective: Endorses left-sided flank pain, radiating into groin.  Also endorses nausea.    Hospital Outcome:     John Roy is a 49 y.o. male, with a past medical history including, but not limited to, ADHD, anxiety, RAMSES not requiring CPAP, presented to the emergency department with a sudden onset of right sided flank pain that began around 10 PM.  He states that he has had nausea but no vomiting.  He denies any hematuria, frequency, or dysuria.  He denies  having a kidney stone previously.    Urology is consulted and has evaluated the patient.  Patient would prefer to trial passage of stone.  If unsuccessful he will go to the OR tomorrow for stent placement.    ROS:  General: no fevers, chills  Respiratory: no cough, dyspnea  Cardiovascular: no chest pain, palpitations  Abdomen: No abdominal pain, nausea, vomiting, or diarrhea  Neurologic: No focal weakness    Objective   Physical Exam:  I have reviewed the vital signs.  Temp:  [96.8 °F (36 °C)-97.9 °F (36.6 °C)] 97.9 °F (36.6 °C)  Heart Rate:  [72-86] 86  Resp:  [16-20] 18  BP: (120-136)/(76-92) 136/76  General Appearance:    Alert, cooperative, no distress  Head:    Normocephalic, atraumatic  Eyes:    Sclerae anicteric  Neck:   Supple, no mass  Lungs: Clear to auscultation bilaterally, respirations unlabored  Heart: Regular rate and rhythm, S1 and S2 normal, no murmur, rub or gallop  Abdomen:  Soft, nontender, bowel sounds active, nondistended  Extremities: No clubbing, cyanosis, or edema to lower extremities  Pulses:  2+ and symmetric in distal lower extremities  Skin: No rashes   Neurologic: Oriented x3, Normal strength to extremities    Results Review:    I have reviewed the labs, radiology results and diagnostic studies.    Results from last 7 days   Lab Units 10/16/24  0217   WBC 10*3/mm3 18.83*   HEMOGLOBIN g/dL 15.7   HEMATOCRIT %  45.1   PLATELETS 10*3/mm3 238     Results from last 7 days   Lab Units 10/16/24  0217 10/11/24  1316   SODIUM mmol/L 138 141   POTASSIUM mmol/L 3.5 4.5   CHLORIDE mmol/L 100 100   CO2 mmol/L 23.6 26   BUN mg/dL 16 15   CREATININE mg/dL 1.36* 1.12   CALCIUM mg/dL 9.9 10.1   BILIRUBIN mg/dL 0.5 0.4   ALK PHOS U/L 68 73   ALT (SGPT) U/L 42* 39   AST (SGOT) U/L 35 37   GLUCOSE mg/dL 170* 89     Imaging Results (Last 24 Hours)       Procedure Component Value Units Date/Time    CT Abdomen Pelvis Without Contrast [[id number]] Collected: 10/16/24 0340     Updated: 10/16/24 0347    Narrative:      CT OF THE ABDOMEN PELVIS WITHOUT CONTRAST     HISTORY: Right flank pain     COMPARISON: None available.     TECHNIQUE: Axial CT imaging was obtained through the abdomen and pelvis.  No IV contrast was administered.     FINDINGS:  Images through the lung bases are clear. No suspicious hepatic lesions  are seen. The spleen adrenal glands, pancreas, and gallbladder are  normal. Stomach and duodenum are probably within normal limits. The  patient has mild right-sided hydronephrosis. This is secondary to a 3 mm  stone, which is located within the proximal right ureter. There are  additional nonobstructing stones noted within both kidneys. Single  largest on the right measures up to 6 mm. Single largest on the left  measures 3 mm. No distal ureteral or bladder stones are seen. Prostate  gland contains dystrophic calcifications. There is colonic  diverticulosis. There is no bowel obstruction. The appendix is normal.  There is a small fat-containing umbilical hernia. No acute osseous  abnormalities are seen.          Impression:      Mild right-sided hydronephrosis, secondary to a 3 mm stone within the  proximal right ureter.     Radiation dose reduction techniques were utilized, including automated  exposure control and exposure modulation based on body size.        This report was finalized on 10/16/2024 3:44 AM by Dr. Pepper  TOMMY White on Workstation: [id number]               I have reviewed the medications.  ---------------------------------------------------------------------------------------------  Assessment & Plan   Assessment/Problem List    Kidney stone    Plan:    Kidney stone:  -Mild right-sided hydronephrosis, secondary to a 3 mm stone within the proximal right ureter.  -Urology following  -Pain control  -Vital signs every 4 hours  -As needed antiemetic  -Flomax 0.4 mg po daily  -Rocephin x1 today  -Strain all urine  -NPO after midnight     Anxiety  -Continue duloxetine     VTE Prophylaxis:  Mechanical VTE prophylaxis orders are present.    Disposition: Will remain in ED observation unit overnight.  Urology    This note will serve as a progress note    Gladys Stanton, APRN 10/16/24 19:01 EDT    I have worn appropriate PPE during this patient encounter, sanitized my hands both with entering and exiting patient's room.    55 minutes has been spent by [facility] providers in the care of this patient while under observation status

## 2024-10-16 NOTE — PLAN OF CARE
Goal Outcome Evaluation:patient still having pain. Straining all urine no evidence of stone passing yet. Patient states he just wants this taken care of tomorrow with the stent and removal.

## 2024-10-16 NOTE — CASE MANAGEMENT/SOCIAL WORK
Discharge Planning Assessment  [facility]     Patient Name: John Roy  MRN: [id number]  Today's Date: 10/16/2024    Admit Date: 10/16/2024    Plan: Home with family.   Discharge Needs Assessment       Row Name 10/16/24 1150       Living Environment    People in Home spouse;child(tc), dependent    Current Living Arrangements home    Primary Care Provided by self    Provides Primary Care For child(tc)    Family Caregiver if Needed spouse    Family Caregiver Names Carole [phone]    Quality of Family Relationships helpful;involved;supportive    Able to Return to Prior Arrangements yes       Transition Planning    Patient/Family Anticipates Transition to home with family    Patient/Family Anticipated Services at Transition none    Transportation Anticipated family or friend will provide       Discharge Needs Assessment    Readmission Within the Last 30 Days no previous admission in last 30 days    Equipment Currently Used at Home none    Concerns to be Addressed denies needs/concerns at this time                   Discharge Plan       Row Name 10/16/24 2763       Plan    Plan Home with family.    Patient/Family in Agreement with Plan yes    Plan Comments Met with patient and spouse-Carole [phone] at bedside, introduced self and explained CCP role. Verified facesheet and PCP- Venkatesh Davis. Patient lives at home with spouse and three dependent children. Patient was ind with ADLS and driving prior to admission. Home has 3 steps to enter. Patient denies h/o HH/SNF and has no DME. Patient uses [facility] on [facility] and reports no difficulty affording medications. Family will transport at DC. Patient denies DC needs at this time. CCP to follow. Elier ZULETA RN                  Continued Care and Services - Admitted Since 10/16/2024    No active coordination exists for this encounter.          Demographic Summary       Row Name 10/16/24 1151       General Information    Admission Type  observation    Referral Source admission list    Reason for Consult discharge planning    Preferred Language English                   Functional Status       Row Name 10/16/24 8587       Functional Status    Usual Activity Tolerance good    Current Activity Tolerance good       Functional Status, IADL    Medications independent    Meal Preparation independent    Housekeeping independent    Laundry independent    Shopping independent    If for any reason you need help with day-to-day activities such as bathing, preparing meals, shopping, managing finances, etc., do you get the help you need? I don't need any help                   Psychosocial    No documentation.                  Abuse/Neglect    No documentation.                  Legal    No documentation.                  Substance Abuse    No documentation.                  Patient Forms    No documentation.                     Elier Rivas RN

## 2024-10-16 NOTE — PROGRESS NOTES
[facility] ATTESTATION NOTE    SHARED VISIT: This visit was performed by BOTH a physician and an APC. The substantive portion of the medical decision making was performed by this attesting physician who made or approved the management plan and takes responsibility for patient management. All studies in the APC note (if performed) were independently interpreted by me.     The MARIAM and I have discussed this patient's history, physical exam, and treatment plan.  I have reviewed the documentation and personally had a face to face interaction with the patient. I provided a substantive portion of the care of the patient.  I affirm the documentation and agree with the treatment and plan.  The note below describes my personal findings:         S: Doing well this am.  Still with some right lower groin pain but no back pain, no nausea, no vomiting, and no fevers or chills.       O:  Exam  General : well appearing cm up to bathroom with steady gait  HEENT: NCAT, eomi, no scleral icterus  Resp: relaxed breathing  Skin: no obvious rashes  Exts: no obvious deformities  Neuro: alert and appropriate, face symmetric, nl speech, moves all 4 exts equally    Diagnostic tests: Cr 1.36, gluc 170, wbc 18.8, ua tnc rbcs with no bact       Assessment and Plan: 50 yo M admitted to the observation unit with right flank pain.  Patient found to have a 3-4 mm proximal ureter stone with hydronephrosis.  Urology consulted.  Patient covered with Rocephin and plan for Flomax and a diet today.  If patient does pass the stone then he can go home.  However plan for n.p.o. after midnight for procedure tomorrow morning if no passage today per urology.

## 2024-10-16 NOTE — CONSULTS
FIRST UROLOGY CONSULT      Patient Identification:  NAME:  John Roy  Age:  49 y.o.   Sex:  male   :  1975   MRN:  [id number]       Chief complaint: right flank pain    History of present illness:  John Roy is a 49 y.o.  who presents with right flank pain radiating to the right testis that started overnight. Some nausea no vomiting.    CT viewed independently interpretation 4 mm mid right ureteral stone with upstream hydroureteronephrosis. 5 mm lower pole right non-obstructing stone. Left 3 mm stones x2, non-obstructing.    Cr 1.36  WBC 18.8  Ua with blood and ketones, only, no infectious markers      Past medical history:  Past Medical History:   Diagnosis Date    ADHD (attention deficit hyperactivity disorder) 23    Anxiety 23    Chronic foot pain, left 2020    Colon polyp 2009    Epigastric discomfort 2020    H/O removal of neck cyst 2023    Left cervical radiculopathy 10/06/2022    Viral gastritis 2017       Past surgical history:  Past Surgical History:   Procedure Laterality Date    COLONOSCOPY      COLONOSCOPY N/A 2023    Procedure: COLONOSCOPY to cecum with hot snare polypectomy;  Surgeon: oJhn Lechuga Jr., MD;  Location: [facility] ENDOSCOPY;  Service: General;  Laterality: N/A;  Pre: hx polyps, family hx colon cancer  Post: polyp    VASECTOMY         Allergies:  Patient has no known allergies.    Home medications:  Medications Prior to Admission   Medication Sig Dispense Refill Last Dose/Taking    amphetamine-dextroamphetamine (Adderall) 20 MG tablet Take 1 tablet in the afternoon 30 tablet 0 10/15/2024    amphetamine-dextroamphetamine XR (Adderall XR) 20 MG 24 hr capsule Take 1 capsule by mouth Every Morning 30 capsule 0 10/15/2024    DULoxetine (CYMBALTA) 60 MG capsule Take 1 tab PO QD for mood 90 capsule 3 10/15/2024        Hospital medications:  DULoxetine, 60 mg, Oral, Daily  sodium chloride, 10 mL, Intravenous,  Q12H  tamsulosin, 0.4 mg, Oral, Daily               acetaminophen **OR** acetaminophen **OR** acetaminophen    senna-docusate sodium **AND** polyethylene glycol **AND** bisacodyl **AND** bisacodyl    Calcium Replacement - Follow Nurse / BPA Driven Protocol    HYDROcodone-acetaminophen    HYDROmorphone    Magnesium Standard Dose Replacement - Follow Nurse / BPA Driven Protocol    ondansetron ODT **OR** ondansetron    Phosphorus Replacement - Follow Nurse / BPA Driven Protocol    Potassium Replacement - Follow Nurse / BPA Driven Protocol    [COMPLETED] Insert Peripheral IV **AND** sodium chloride    sodium chloride    Family history:  Family History   Problem Relation Age of Onset    Cancer Mother     Early death Mother         Age 54    Learning disabilities Mother     Prostate cancer Father     Cancer Father         Prostate Cancer    COPD Father     Anxiety disorder Father     Colon cancer Father     Colon cancer Paternal Uncle     Depression Maternal Grandmother     Colon cancer Paternal Grandmother     Malig Hyperthermia Neg Hx        Social history:  Social History     Tobacco Use    Smoking status: Former     Current packs/day: 0.00     Types: Cigarettes     Quit date: 1996     Years since quittin.8    Smokeless tobacco: Former   Vaping Use    Vaping status: Never Used   Substance Use Topics    Alcohol use: Yes     Alcohol/week: 2.0 standard drinks of alcohol     Types: 2 Cans of beer per week     Comment: socially    Drug use: No       Objective:  TMax 24 hours:   Temp (24hrs), Av.8 °F (36 °C), Min:96.8 °F (36 °C), Max:96.8 °F (36 °C)        Vitals Ranges:   Temp:  [96.8 °F (36 °C)] 96.8 °F (36 °C)  Heart Rate:  [72-83] 83  Resp:  [16-20] 16  BP: (120-129)/(78-92) 129/85    Intake/Output Last 3 shifts:  I/O last 3 completed shifts:  In: 500 [IV Piggyback:500]  Out: -      Physical Exam:    General Appearance:    Alert, cooperative, NAD   HEENT:    No trauma, pupils reactive, hearing intact   Back:      No CVA tenderness   Lungs:     Respirations unlabored, no wheezing    Heart:    RRR, intact peripheral pulses   Abdomen:     Soft, NDNT, no masses, no guarding   :    Not examined   Extremities:   No edema, no deformity   Lymphatic:   No neck or groin LAD   Skin:   No bleeding, bruising or rashes   Neuro/Psych:   Orientation intact, mood/affect pleasant, no focal findings       Results review:   I reviewed the patient's new clinical results.    Data review:  Lab Results (last 24 hours)       Procedure Component Value Units Date/Time    Urinalysis With Microscopic If Indicated (No Culture) - Urine, Clean Catch [[id number]]  (Abnormal) Collected: 10/16/24 0435    Specimen: Urine, Clean Catch Updated: 10/16/24 0502     Color, UA Yellow     Appearance, UA Clear     pH, UA 6.0     Specific Gravity, UA 1.015     Glucose, UA Negative     Ketones, UA 40 mg/dL (2+)     Bilirubin, UA Negative     Blood, UA Large (3+)     Protein, UA Trace     Leuk Esterase, UA Negative     Nitrite, UA Negative     Urobilinogen, UA 1.0 E.U./dL    Urinalysis, Microscopic Only - Urine, Clean Catch [[id number]]  (Abnormal) Collected: 10/16/24 0435    Specimen: Urine, Clean Catch Updated: 10/16/24 0502     RBC, UA Too Numerous to Count /HPF      WBC, UA 0-2 /HPF      Bacteria, UA None Seen /HPF      Squamous Epithelial Cells, UA 0-2 /HPF      Hyaline Casts, UA None Seen /LPF      Methodology Automated Microscopy    Comprehensive Metabolic Panel [[id number]]  (Abnormal) Collected: 10/16/24 0217    Specimen: Blood Updated: 10/16/24 0252     Glucose 170 mg/dL      BUN 16 mg/dL      Creatinine 1.36 mg/dL      Sodium 138 mmol/L      Potassium 3.5 mmol/L      Chloride 100 mmol/L      CO2 23.6 mmol/L      Calcium 9.9 mg/dL      Total Protein 7.6 g/dL      Albumin 4.5 g/dL      ALT (SGPT) 42 U/L      AST (SGOT) 35 U/L      Alkaline Phosphatase 68 U/L      Total Bilirubin 0.5 mg/dL      Globulin 3.1 gm/dL      A/G Ratio 1.5 g/dL      BUN/Creatinine  Ratio 11.8     Anion Gap 14.4 mmol/L      eGFR 64.2 mL/min/1.73     Narrative:      GFR Normal >60  Chronic Kidney Disease <60  Kidney Failure <15      Magnesium [[id number]]  (Normal) Collected: 10/16/24 0217    Specimen: Blood Updated: 10/16/24 0252     Magnesium 2.1 mg/dL     CBC & Differential [[id number]]  (Abnormal) Collected: 10/16/24 0217    Specimen: Blood Updated: 10/16/24 0233    Narrative:      The following orders were created for panel order CBC & Differential.  Procedure                               Abnormality         Status                     ---------                               -----------         ------                     CBC Auto Differential[[id number]]        Abnormal            Final result                 Please view results for these tests on the individual orders.    CBC Auto Differential [[id number]]  (Abnormal) Collected: 10/16/24 0217    Specimen: Blood Updated: 10/16/24 0233     WBC 18.83 10*3/mm3      RBC 5.13 10*6/mm3      Hemoglobin 15.7 g/dL      Hematocrit 45.1 %      MCV 87.9 fL      MCH 30.6 pg      MCHC 34.8 g/dL      RDW 12.7 %      RDW-SD 40.9 fl      MPV 9.8 fL      Platelets 238 10*3/mm3      Neutrophil % 81.8 %      Lymphocyte % 13.0 %      Monocyte % 4.0 %      Eosinophil % 0.4 %      Basophil % 0.3 %      Immature Grans % 0.5 %      Neutrophils, Absolute 15.41 10*3/mm3      Lymphocytes, Absolute 2.45 10*3/mm3      Monocytes, Absolute 0.75 10*3/mm3      Eosinophils, Absolute 0.07 10*3/mm3      Basophils, Absolute 0.05 10*3/mm3      Immature Grans, Absolute 0.10 10*3/mm3      nRBC 0.0 /100 WBC              Imaging:  Imaging Results (Last 24 Hours)       Procedure Component Value Units Date/Time    CT Abdomen Pelvis Without Contrast [[id number]] Collected: 10/16/24 0340     Updated: 10/16/24 0347    Narrative:      CT OF THE ABDOMEN PELVIS WITHOUT CONTRAST     HISTORY: Right flank pain     COMPARISON: None available.     TECHNIQUE: Axial CT imaging was obtained through the  abdomen and pelvis.  No IV contrast was administered.     FINDINGS:  Images through the lung bases are clear. No suspicious hepatic lesions  are seen. The spleen adrenal glands, pancreas, and gallbladder are  normal. Stomach and duodenum are probably within normal limits. The  patient has mild right-sided hydronephrosis. This is secondary to a 3 mm  stone, which is located within the proximal right ureter. There are  additional nonobstructing stones noted within both kidneys. Single  largest on the right measures up to 6 mm. Single largest on the left  measures 3 mm. No distal ureteral or bladder stones are seen. Prostate  gland contains dystrophic calcifications. There is colonic  diverticulosis. There is no bowel obstruction. The appendix is normal.  There is a small fat-containing umbilical hernia. No acute osseous  abnormalities are seen.          Impression:      Mild right-sided hydronephrosis, secondary to a 3 mm stone within the  proximal right ureter.     Radiation dose reduction techniques were utilized, including automated  exposure control and exposure modulation based on body size.        This report was finalized on 10/16/2024 3:44 AM by Dr. Shantelle White M.D on Workstation: [id number]                  Assessment:       Kidney stone        50 yo male w/ R ureteral stone  Chronic with acute exacerbation    Plan:     Images viewed, labs reviewed  Pain improved, I don't think infected likely wbc is reactive  Wants to try trial of passage    Ok for diet today, hydrate aggressively  Make NPO midnight, discussed possible R stent  Flomax 0.4 mg   Ceftriaxone 1 g IV  Strain urine  Please notify urology and make NPO if febrile or significant vital instability  Will follow        Mendoza Weaver MD  10/16/24  07:12 EDT

## 2024-10-17 ENCOUNTER — APPOINTMENT (OUTPATIENT)
Dept: GENERAL RADIOLOGY | Facility: HOSPITAL | Age: 49
End: 2024-10-17
Payer: COMMERCIAL

## 2024-10-17 PROCEDURE — 74018 RADEX ABDOMEN 1 VIEW: CPT

## 2024-10-17 PROCEDURE — 25010000002 HYDROMORPHONE PER 4 MG: Performed by: EMERGENCY MEDICINE

## 2024-10-17 PROCEDURE — G0378 HOSPITAL OBSERVATION PER HR: HCPCS

## 2024-10-17 PROCEDURE — 96376 TX/PRO/DX INJ SAME DRUG ADON: CPT

## 2024-10-17 RX ORDER — CALCIUM CARBONATE 500 MG/1
2 TABLET, CHEWABLE ORAL 3 TIMES DAILY PRN
Status: DISCONTINUED | OUTPATIENT
Start: 2024-10-17 | End: 2024-10-18 | Stop reason: HOSPADM

## 2024-10-17 RX ADMIN — HYDROCODONE BITARTRATE AND ACETAMINOPHEN 1 TABLET: 7.5; 325 TABLET ORAL at 22:22

## 2024-10-17 RX ADMIN — HYDROCODONE BITARTRATE AND ACETAMINOPHEN 1 TABLET: 7.5; 325 TABLET ORAL at 00:32

## 2024-10-17 RX ADMIN — ACETAMINOPHEN 325MG 650 MG: 325 TABLET ORAL at 14:57

## 2024-10-17 RX ADMIN — TAMSULOSIN HYDROCHLORIDE 0.4 MG: 0.4 CAPSULE ORAL at 09:23

## 2024-10-17 RX ADMIN — ACETAMINOPHEN 325MG 650 MG: 325 TABLET ORAL at 09:23

## 2024-10-17 RX ADMIN — SENNOSIDES AND DOCUSATE SODIUM 2 TABLET: 50; 8.6 TABLET ORAL at 11:56

## 2024-10-17 RX ADMIN — Medication 10 ML: at 09:23

## 2024-10-17 RX ADMIN — Medication 10 ML: at 22:22

## 2024-10-17 RX ADMIN — ANTACID TABLETS 2 TABLET: 500 TABLET, CHEWABLE ORAL at 02:45

## 2024-10-17 RX ADMIN — HYDROMORPHONE HYDROCHLORIDE 0.5 MG: 1 INJECTION, SOLUTION INTRAMUSCULAR; INTRAVENOUS; SUBCUTANEOUS at 02:45

## 2024-10-17 RX ADMIN — HYDROMORPHONE HYDROCHLORIDE 0.5 MG: 1 INJECTION, SOLUTION INTRAMUSCULAR; INTRAVENOUS; SUBCUTANEOUS at 16:44

## 2024-10-17 RX ADMIN — HYDROMORPHONE HYDROCHLORIDE 0.5 MG: 1 INJECTION, SOLUTION INTRAMUSCULAR; INTRAVENOUS; SUBCUTANEOUS at 06:51

## 2024-10-17 RX ADMIN — DULOXETINE HYDROCHLORIDE 60 MG: 60 CAPSULE, DELAYED RELEASE ORAL at 09:23

## 2024-10-17 NOTE — PROGRESS NOTES
ED OBSERVATION PROGRESS/DISCHARGE SUMMARY    Date of Admission: 10/16/2024   LOS: 0 days   PCP: Venkatesh Davis MD    Subjective: Continues to have significant pain, somewhat improved since yesterday.    Hospital Outcome:   John Roy is a 49 y.o. male, with a past medical history including, but not limited to, ADHD, anxiety, RAMSES not requiring CPAP, presented to the emergency department with a sudden onset of right sided flank pain that began around 10 PM.  He states that he has had nausea but no vomiting.  He denies any hematuria, frequency, or dysuria.  He denies  having a kidney stone previously.     Urology is consulted and has evaluated the patient.  Patient would prefer to trial passage of stone.  If unsuccessful he will go to the OR tomorrow for stent placement.    10/17/2024:  Urology reevaluated patient.  Plan for ESWL tomorrow.    ROS:  General: no fevers, chills  Respiratory: no cough, dyspnea  Cardiovascular: no chest pain, palpitations  Abdomen: No abdominal pain, nausea, vomiting, or diarrhea  Neurologic: No focal weakness    Objective   Physical Exam:  I have reviewed the vital signs.  Temp:  [97.8 °F (36.6 °C)-98.9 °F (37.2 °C)] 97.8 °F (36.6 °C)  Heart Rate:  [] 88  Resp:  [16-18] 18  BP: (108-135)/(78-90) 117/81  General Appearance:    Alert, cooperative, no distress  Head:    Normocephalic, atraumatic  Eyes:    Sclerae anicteric  Neck:   Supple, no mass  Lungs: Clear to auscultation bilaterally, respirations unlabored  Heart: Regular rate and rhythm, S1 and S2 normal, no murmur, rub or gallop  Abdomen:  Soft, nontender, bowel sounds active, nondistended  Extremities: No clubbing, cyanosis, or edema to lower extremities  Pulses:  2+ and symmetric in distal lower extremities  Skin: No rashes   Neurologic: Oriented x3, Normal strength to extremities    Results Review:    I have reviewed the labs, radiology results and diagnostic studies.    Results from last 7 days   Lab Units  10/16/24  2310   WBC 10*3/mm3 17.68*   HEMOGLOBIN g/dL 15.3   HEMATOCRIT % 45.8   PLATELETS 10*3/mm3 195     Results from last 7 days   Lab Units 10/16/24  2310 10/16/24  0217 10/11/24  1316   SODIUM mmol/L 136 138 141   POTASSIUM mmol/L 3.6 3.5 4.5   CHLORIDE mmol/L 98 100 100   CO2 mmol/L 28.3 23.6 26   BUN mg/dL 11 16 15   CREATININE mg/dL 1.23 1.36* 1.12   CALCIUM mg/dL 9.4 9.9 10.1   BILIRUBIN mg/dL  --  0.5 0.4   ALK PHOS U/L  --  68 73   ALT (SGPT) U/L  --  42* 39   AST (SGOT) U/L  --  35 37   GLUCOSE mg/dL 115* 170* 89     Imaging Results (Last 24 Hours)       Procedure Component Value Units Date/Time    XR Abdomen KUB [[id number]] Collected: 10/17/24 0754     Updated: 10/17/24 0802    Narrative:      XR ABDOMEN KUB-     HISTORY: 49-year-old male with a 3 mm right ureteral stone at the level  of the iliac crests on yesterday's CT.     FINDINGS: There is unfortunately localized colonic stool and markings in  the right lower abdomen in the expected location of the mid right  ureteral stone and the location of the stone is unclear.  There is a single 6 mm nonobstructing stone within the right kidney and  two 3 mm nonobstructing stones within the left kidney as on the CT.  Pelvic phleboliths are noted.     This report was finalized on 10/17/2024 7:59 AM by Dr. Susan David M.D  on Workstation: [id number]               I have reviewed the medications.  ---------------------------------------------------------------------------------------------  Assessment & Plan   Assessment/Problem List    Kidney stone      Plan:  Kidney stone:  -Mild right-sided hydronephrosis, secondary to a 3 mm stone within the proximal right ureter.  -Urology following  -Pain control  -Vital signs every 4 hours  -As needed antiemetic  -Flomax 0.4 mg po daily  -Rocephin x1 today  -Strain all urine  -NPO after midnight      Anxiety  -Continue duloxetine    Disposition: Will remain in ED observation unit.  Plan to go to OR for lithotripsy  tomorrow.    Follow-up after Discharge: Urology    This note will serve as a progress note    Gladys Stanton, APRN 10/17/24 18:41 EDT    I have worn appropriate PPE during this patient encounter, sanitized my hands both with entering and exiting patient's room.    55 minutes has been spent by [facility] providers in the care of this patient while under observation status

## 2024-10-17 NOTE — PLAN OF CARE
Goal Outcome Evaluation:  Plan of Care Reviewed With: patient        Progress: improving  Outcome Evaluation: Pt is planned to have a lithotripsy with possble stent placed, pt will be NPO at misnight,pt states pain is better,       Problem: Adult Inpatient Plan of Care  Goal: Plan of Care Review  Outcome: Progressing  Flowsheets (Taken 10/17/2024 1843)  Progress: improving  Outcome Evaluation: Pt is planned to have a lithotripsy with possble stent placed, pt will be NPO at misnight,pt states pain is better,  Plan of Care Reviewed With: patient  Goal: Patient-Specific Goal (Individualized)  Outcome: Progressing  Goal: Absence of Hospital-Acquired Illness or Injury  Outcome: Progressing  Goal: Optimal Comfort and Wellbeing  Outcome: Progressing  Intervention: Provide Person-Centered Care  Recent Flowsheet Documentation  Taken 10/17/2024 0800 by Antonia Ramírez, RN  Trust Relationship/Rapport: care explained  Goal: Readiness for Transition of Care  Outcome: Progressing     Problem: Pain Acute  Goal: Optimal Pain Control and Function  Outcome: Progressing  Intervention: Optimize Psychosocial Wellbeing  Recent Flowsheet Documentation  Taken 10/17/2024 0800 by Antonia Ramírez, RN  Diversional Activities: television

## 2024-10-17 NOTE — PLAN OF CARE
Goal Outcome Evaluation:              Outcome Evaluation: Patient is alert and oriented times 4, on room air and up ad gamaliel baseline. He was admitted due to right flank pain, CT showed positive 3mm stone. He is still complaining of pain worst with activity, negative when urinating. Pain medicine given as needed. Instructed to NPO after midnight. Urology consulted and following. Possibe to have stent placement for right kidney stone today.

## 2024-10-17 NOTE — PROGRESS NOTES
MD ATTESTATION NOTE - Observation progress    The MARIAM and I have discussed this patient's history, physical exam, and treatment plan.  I have reviewed the documentation and personally had a face to face interaction with the patient. I affirm the documentation and agree with the treatment and plan.  The attached note describes my personal findings.        SHARED APC FACE TO FACE: I performed a substantive part of the MDM during the patient's E/M visit. I personally evaluated and examined the patient. I personally made or approved the documented management plan and acknowledge its risk of complications.      Brief HPI: Patient admitted the observation unit for kidney stone with pain.  Patient states he has had some mild pain throughout the night.  Has had some nausea.  No fevers or chills.  Continues to have some discomfort.          GENERAL: no acute distress  HENT: nares patent  EYES: no scleral icterus  CV: regular rhythm, normal rate  RESPIRATORY: normal effort  ABDOMEN: soft.  Mild tenderness to lower abdomen  MUSCULOSKELETAL: no deformity  NEURO: alert, moves all extremities, follows commands  PSYCH:  calm, cooperative  SKIN: warm, dry    Vital signs and nursing notes reviewed.        Plan: Urology following.  Pain control.  Blood cell count improved

## 2024-10-17 NOTE — PROGRESS NOTES
"   LOS: 0 days   Patient Care Team:  Venkatesh Davis MD as PCP - General (Family Medicine)      Subjective   Interval History: Right flank pain has improved but still present.    Objective     ROS   12 POINT NEG ROS PERTINENT IN HPI      Vital Signs  Temp:  [97.9 °F (36.6 °C)-98.9 °F (37.2 °C)] 97.9 °F (36.6 °C)  Heart Rate:  [] 81  Resp:  [16-18] 18  BP: (108-136)/(76-90) 108/78    No intake or output data in the 24 hours ending 10/17/24 1044    Flowsheet Rows      Flowsheet Row First Filed Value   Admission Height 172.7 cm (68\") Documented at 10/16/2024 0147   Admission Weight 74.8 kg (165 lb) Documented at 10/16/2024 0147            Physical Exam:     General appearance: alert, cooperative, oriented      Lab Results (all)       Procedure Component Value Units Date/Time    Blood Culture - Blood, Arm, Right [[id number]]  (Normal) Collected: 10/16/24 0827    Specimen: Blood from Arm, Right Updated: 10/17/24 0845     Blood Culture No growth at 24 hours    Blood Culture - Blood, Arm, Left [[id number]]  (Normal) Collected: 10/16/24 0827    Specimen: Blood from Arm, Left Updated: 10/17/24 0845     Blood Culture No growth at 24 hours    Basic Metabolic Panel [[id number]]  (Abnormal) Collected: 10/16/24 2310    Specimen: Blood from Arm, Left Updated: 10/16/24 2345     Glucose 115 mg/dL      BUN 11 mg/dL      Creatinine 1.23 mg/dL      Sodium 136 mmol/L      Potassium 3.6 mmol/L      Chloride 98 mmol/L      CO2 28.3 mmol/L      Calcium 9.4 mg/dL      BUN/Creatinine Ratio 8.9     Anion Gap 9.7 mmol/L      eGFR 72.4 mL/min/1.73     Narrative:      GFR Normal >60  Chronic Kidney Disease <60  Kidney Failure <15      CBC (No Diff) [[id number]]  (Abnormal) Collected: 10/16/24 2310    Specimen: Blood from Arm, Left Updated: 10/16/24 2320     WBC 17.68 10*3/mm3      RBC 5.11 10*6/mm3      Hemoglobin 15.3 g/dL      Hematocrit 45.8 %      MCV 89.6 fL      MCH 29.9 pg      MCHC 33.4 g/dL      RDW 12.7 %      RDW-SD 42.1 fl      MPV " 9.5 fL      Platelets 195 10*3/mm3     Urinalysis With Microscopic If Indicated (No Culture) - Urine, Clean Catch [[id number]]  (Abnormal) Collected: 10/16/24 0435    Specimen: Urine, Clean Catch Updated: 10/16/24 0502     Color, UA Yellow     Appearance, UA Clear     pH, UA 6.0     Specific Gravity, UA 1.015     Glucose, UA Negative     Ketones, UA 40 mg/dL (2+)     Bilirubin, UA Negative     Blood, UA Large (3+)     Protein, UA Trace     Leuk Esterase, UA Negative     Nitrite, UA Negative     Urobilinogen, UA 1.0 E.U./dL    Urinalysis, Microscopic Only - Urine, Clean Catch [[id number]]  (Abnormal) Collected: 10/16/24 0435    Specimen: Urine, Clean Catch Updated: 10/16/24 0502     RBC, UA Too Numerous to Count /HPF      WBC, UA 0-2 /HPF      Bacteria, UA None Seen /HPF      Squamous Epithelial Cells, UA 0-2 /HPF      Hyaline Casts, UA None Seen /LPF      Methodology Automated Microscopy    Comprehensive Metabolic Panel [[id number]]  (Abnormal) Collected: 10/16/24 0217    Specimen: Blood Updated: 10/16/24 0252     Glucose 170 mg/dL      BUN 16 mg/dL      Creatinine 1.36 mg/dL      Sodium 138 mmol/L      Potassium 3.5 mmol/L      Chloride 100 mmol/L      CO2 23.6 mmol/L      Calcium 9.9 mg/dL      Total Protein 7.6 g/dL      Albumin 4.5 g/dL      ALT (SGPT) 42 U/L      AST (SGOT) 35 U/L      Alkaline Phosphatase 68 U/L      Total Bilirubin 0.5 mg/dL      Globulin 3.1 gm/dL      A/G Ratio 1.5 g/dL      BUN/Creatinine Ratio 11.8     Anion Gap 14.4 mmol/L      eGFR 64.2 mL/min/1.73     Narrative:      GFR Normal >60  Chronic Kidney Disease <60  Kidney Failure <15      Magnesium [[id number]]  (Normal) Collected: 10/16/24 0217    Specimen: Blood Updated: 10/16/24 0252     Magnesium 2.1 mg/dL     CBC & Differential [[id number]]  (Abnormal) Collected: 10/16/24 0217    Specimen: Blood Updated: 10/16/24 0233    Narrative:      The following orders were created for panel order CBC & Differential.  Procedure                                Abnormality         Status                     ---------                               -----------         ------                     CBC Auto Differential[[id number]]        Abnormal            Final result                 Please view results for these tests on the individual orders.    CBC Auto Differential [[id number]]  (Abnormal) Collected: 10/16/24 0217    Specimen: Blood Updated: 10/16/24 0233     WBC 18.83 10*3/mm3      RBC 5.13 10*6/mm3      Hemoglobin 15.7 g/dL      Hematocrit 45.1 %      MCV 87.9 fL      MCH 30.6 pg      MCHC 34.8 g/dL      RDW 12.7 %      RDW-SD 40.9 fl      MPV 9.8 fL      Platelets 238 10*3/mm3      Neutrophil % 81.8 %      Lymphocyte % 13.0 %      Monocyte % 4.0 %      Eosinophil % 0.4 %      Basophil % 0.3 %      Immature Grans % 0.5 %      Neutrophils, Absolute 15.41 10*3/mm3      Lymphocytes, Absolute 2.45 10*3/mm3      Monocytes, Absolute 0.75 10*3/mm3      Eosinophils, Absolute 0.07 10*3/mm3      Basophils, Absolute 0.05 10*3/mm3      Immature Grans, Absolute 0.10 10*3/mm3      nRBC 0.0 /100 WBC             Imaging Results (All)       Procedure Component Value Units Date/Time    XR Abdomen KUB [[id number]] Collected: 10/17/24 0754     Updated: 10/17/24 0802    Narrative:      XR ABDOMEN KUB-     HISTORY: 49-year-old male with a 3 mm right ureteral stone at the level  of the iliac crests on yesterday's CT.     FINDINGS: There is unfortunately localized colonic stool and markings in  the right lower abdomen in the expected location of the mid right  ureteral stone and the location of the stone is unclear.  There is a single 6 mm nonobstructing stone within the right kidney and  two 3 mm nonobstructing stones within the left kidney as on the CT.  Pelvic phleboliths are noted.     This report was finalized on 10/17/2024 7:59 AM by Dr. Susan David M.D  on Workstation: [id number]       CT Abdomen Pelvis Without Contrast [[id number]] Collected: 10/16/24 0340     Updated: 10/16/24  0347    Narrative:      CT OF THE ABDOMEN PELVIS WITHOUT CONTRAST     HISTORY: Right flank pain     COMPARISON: None available.     TECHNIQUE: Axial CT imaging was obtained through the abdomen and pelvis.  No IV contrast was administered.     FINDINGS:  Images through the lung bases are clear. No suspicious hepatic lesions  are seen. The spleen adrenal glands, pancreas, and gallbladder are  normal. Stomach and duodenum are probably within normal limits. The  patient has mild right-sided hydronephrosis. This is secondary to a 3 mm  stone, which is located within the proximal right ureter. There are  additional nonobstructing stones noted within both kidneys. Single  largest on the right measures up to 6 mm. Single largest on the left  measures 3 mm. No distal ureteral or bladder stones are seen. Prostate  gland contains dystrophic calcifications. There is colonic  diverticulosis. There is no bowel obstruction. The appendix is normal.  There is a small fat-containing umbilical hernia. No acute osseous  abnormalities are seen.          Impression:      Mild right-sided hydronephrosis, secondary to a 3 mm stone within the  proximal right ureter.     Radiation dose reduction techniques were utilized, including automated  exposure control and exposure modulation based on body size.        This report was finalized on 10/16/2024 3:44 AM by Dr. Shantelle White M.D on Workstation: [id number]               Medication Review:   Current Facility-Administered Medications   Medication Dose Route Frequency Provider Last Rate Last Admin    acetaminophen (TYLENOL) tablet 650 mg  650 mg Oral Q4H PRN Samina Castañeda APRN   650 mg at 10/17/24 0923    Or    acetaminophen (TYLENOL) 160 MG/5ML oral solution 650 mg  650 mg Oral Q4H PRN Samina Castañeda APRN        Or    acetaminophen (TYLENOL) suppository 650 mg  650 mg Rectal Q4H PRN Samina Castañeda APRN        sennosides-docusate (PERICOLACE) 8.6-50 MG per tablet 2 tablet  2 tablet  Oral BID PRN Samina Castañeda APRN        And    polyethylene glycol (MIRALAX) packet 17 g  17 g Oral Daily PRN Samina Castañeda APRN        And    bisacodyl (DULCOLAX) EC tablet 5 mg  5 mg Oral Daily PRN Samina Castañeda APRN        And    bisacodyl (DULCOLAX) suppository 10 mg  10 mg Rectal Daily PRN Samina Castañeda APRN        calcium carbonate (TUMS) chewable tablet 500 mg (200 mg elemental)  2 tablet Oral TID PRN Samina Castañeda APRN   2 tablet at 10/17/24 0245    Calcium Replacement - Follow Nurse / BPA Driven Protocol   Does not apply PRN Samina Castañeda APRN        DULoxetine (CYMBALTA) DR capsule 60 mg  60 mg Oral Daily Samina Castañeda APRN   60 mg at 10/17/24 0923    HYDROcodone-acetaminophen (NORCO) 7.5-325 MG per tablet 1 tablet  1 tablet Oral Q6H PRN Samina Castañeda APRN   1 tablet at 10/17/24 0032    HYDROmorphone (DILAUDID) injection 0.5 mg  0.5 mg Intravenous Q2H PRN Verenice Gold MD   0.5 mg at 10/17/24 0651    Magnesium Standard Dose Replacement - Follow Nurse / BPA Driven Protocol   Does not apply PRN Samina Castañeda APRN        methocarbamol (ROBAXIN) tablet 750 mg  750 mg Oral Q8H PRN Vivian Stantona C, APRN   750 mg at 10/16/24 1939    ondansetron ODT (ZOFRAN-ODT) disintegrating tablet 4 mg  4 mg Oral Q6H PRN Samina Castañeda APRN        Or    ondansetron (ZOFRAN) injection 4 mg  4 mg Intravenous Q6H PRN Samina Castañeda APRN   4 mg at 10/16/24 1216    phenazopyridine (PYRIDIUM) tablet 100 mg  100 mg Oral TID PRN Maximino Gladys C, APRN        Phosphorus Replacement - Follow Nurse / BPA Driven Protocol   Does not apply PRN Samina Castañeda APRN        Potassium Replacement - Follow Nurse / BPA Driven Protocol   Does not apply PRN Garry, Samina S, APRN        sodium chloride 0.9 % flush 10 mL  10 mL Intravenous PRN Darshan Chacon MD        sodium chloride 0.9 % flush 10 mL  10 mL Intravenous Q12H Samina Castañeda APRN   10 mL at 10/17/24 0923    sodium chloride 0.9 % flush 10 mL  10 mL  Intravenous PRN Samina Castañeda APRN        tamsulosin (FLOMAX) 24 hr capsule 0.4 mg  0.4 mg Oral Daily Samina Castañeda APRN   0.4 mg at 10/17/24 0923       Current Facility-Administered Medications:     acetaminophen (TYLENOL) tablet 650 mg, 650 mg, Oral, Q4H PRN, 650 mg at 10/17/24 0923 **OR** acetaminophen (TYLENOL) 160 MG/5ML oral solution 650 mg, 650 mg, Oral, Q4H PRN **OR** acetaminophen (TYLENOL) suppository 650 mg, 650 mg, Rectal, Q4H PRN, Samina Castañeda APRN    sennosides-docusate (PERICOLACE) 8.6-50 MG per tablet 2 tablet, 2 tablet, Oral, BID PRN **AND** polyethylene glycol (MIRALAX) packet 17 g, 17 g, Oral, Daily PRN **AND** bisacodyl (DULCOLAX) EC tablet 5 mg, 5 mg, Oral, Daily PRN **AND** bisacodyl (DULCOLAX) suppository 10 mg, 10 mg, Rectal, Daily PRN, Samina Castañeda APRN    calcium carbonate (TUMS) chewable tablet 500 mg (200 mg elemental), 2 tablet, Oral, TID PRN, Samina Castañeda APRN, 2 tablet at 10/17/24 0245    Calcium Replacement - Follow Nurse / BPA Driven Protocol, , Does not apply, PRN, Samina Castañeda APRN    DULoxetine (CYMBALTA) DR capsule 60 mg, 60 mg, Oral, Daily, Samina Castañeda APRN, 60 mg at 10/17/24 0923    HYDROcodone-acetaminophen (NORCO) 7.5-325 MG per tablet 1 tablet, 1 tablet, Oral, Q6H PRN, Samina Castañeda APRN, 1 tablet at 10/17/24 0032    HYDROmorphone (DILAUDID) injection 0.5 mg, 0.5 mg, Intravenous, Q2H PRN, Verenice Gold MD, 0.5 mg at 10/17/24 0651    Magnesium Standard Dose Replacement - Follow Nurse / BPA Driven Protocol, , Does not apply, PRN, Samina Castañeda APRN    methocarbamol (ROBAXIN) tablet 750 mg, 750 mg, Oral, Q8H PRN, Gladys Stanton APRN, 750 mg at 10/16/24 1939    ondansetron ODT (ZOFRAN-ODT) disintegrating tablet 4 mg, 4 mg, Oral, Q6H PRN **OR** ondansetron (ZOFRAN) injection 4 mg, 4 mg, Intravenous, Q6H PRN, Samina Castañeda APRN, 4 mg at 10/16/24 1216    phenazopyridine (PYRIDIUM) tablet 100 mg, 100 mg, Oral, TID PRN, Gladys Stanton APRN     Phosphorus Replacement - Follow Nurse / BPA Driven Protocol, , Does not apply, PRN, GarrySamina tony S, APRN    Potassium Replacement - Follow Nurse / BPA Driven Protocol, , Does not apply, PRN, GarrySamina S, APRN    [COMPLETED] Insert Peripheral IV, , , Once **AND** sodium chloride 0.9 % flush 10 mL, 10 mL, Intravenous, PRN, Darshan Chacon MD    sodium chloride 0.9 % flush 10 mL, 10 mL, Intravenous, Q12H, Garry Samina S, APRN, 10 mL at 10/17/24 0923    sodium chloride 0.9 % flush 10 mL, 10 mL, Intravenous, PRN, Garry Samina S, APRN    tamsulosin (FLOMAX) 24 hr capsule 0.4 mg, 0.4 mg, Oral, Daily, Garry Samina S, APRN, 0.4 mg at 10/17/24 0923  Medications Discontinued During This Encounter   Medication Reason    sodium chloride 0.9 % infusion     HYDROmorphone (DILAUDID) injection 0.5 mg        Assessment & Plan   Right ureteral stone      Kidney stone        Plan   - Discussed treatment options including trial of passage, ureteral stent placement, or ESWL with possible stent placement. He would like to move forward with ESWL tomorrow.   - On review of KUB, I cannot definitely see the stone above the SI joint, so we may only be able to perform cystoscopy and stent placement.  - We discussed the risks of the procedure including bleeding, infection, damage to surrounding structures, pain, need to perform additional procedures, possible need for a stent, possible long term risks to kidney function, risks of anesthesia. The patient understands these risks and would like to proceed.    Gómez Germain Jr., MD  10/17/24  10:44 EDT

## 2024-10-18 ENCOUNTER — READMISSION MANAGEMENT (OUTPATIENT)
Dept: CALL CENTER | Facility: HOSPITAL | Age: 49
End: 2024-10-18
Payer: COMMERCIAL

## 2024-10-18 VITALS
OXYGEN SATURATION: 96 % | HEART RATE: 86 BPM | DIASTOLIC BLOOD PRESSURE: 78 MMHG | TEMPERATURE: 98.1 F | HEIGHT: 68 IN | SYSTOLIC BLOOD PRESSURE: 105 MMHG | RESPIRATION RATE: 18 BRPM | WEIGHT: 169.5 LBS | BODY MASS INDEX: 25.69 KG/M2

## 2024-10-18 LAB
ANION GAP SERPL CALCULATED.3IONS-SCNC: 5.3 MMOL/L (ref 5–15)
BUN SERPL-MCNC: 8 MG/DL (ref 6–20)
BUN/CREAT SERPL: 7.8 (ref 7–25)
CALCIUM SPEC-SCNC: 9.6 MG/DL (ref 8.6–10.5)
CHLORIDE SERPL-SCNC: 101 MMOL/L (ref 98–107)
CO2 SERPL-SCNC: 30.7 MMOL/L (ref 22–29)
CREAT SERPL-MCNC: 1.02 MG/DL (ref 0.76–1.27)
DEPRECATED RDW RBC AUTO: 38.7 FL (ref 37–54)
EGFRCR SERPLBLD CKD-EPI 2021: 90.7 ML/MIN/1.73
ERYTHROCYTE [DISTWIDTH] IN BLOOD BY AUTOMATED COUNT: 12.2 % (ref 12.3–15.4)
GLUCOSE SERPL-MCNC: 112 MG/DL (ref 65–99)
HCT VFR BLD AUTO: 41.3 % (ref 37.5–51)
HGB BLD-MCNC: 14.3 G/DL (ref 13–17.7)
MCH RBC QN AUTO: 30.3 PG (ref 26.6–33)
MCHC RBC AUTO-ENTMCNC: 34.6 G/DL (ref 31.5–35.7)
MCV RBC AUTO: 87.5 FL (ref 79–97)
PLATELET # BLD AUTO: 199 10*3/MM3 (ref 140–450)
PMV BLD AUTO: 9.5 FL (ref 6–12)
POTASSIUM SERPL-SCNC: 3.9 MMOL/L (ref 3.5–5.2)
RBC # BLD AUTO: 4.72 10*6/MM3 (ref 4.14–5.8)
SODIUM SERPL-SCNC: 137 MMOL/L (ref 136–145)
WBC NRBC COR # BLD AUTO: 12.07 10*3/MM3 (ref 3.4–10.8)

## 2024-10-18 PROCEDURE — 85027 COMPLETE CBC AUTOMATED: CPT | Performed by: NURSE PRACTITIONER

## 2024-10-18 PROCEDURE — G0378 HOSPITAL OBSERVATION PER HR: HCPCS

## 2024-10-18 PROCEDURE — 80048 BASIC METABOLIC PNL TOTAL CA: CPT | Performed by: NURSE PRACTITIONER

## 2024-10-18 RX ORDER — NAPROXEN SODIUM 550 MG/1
550 TABLET ORAL 2 TIMES DAILY WITH MEALS
Qty: 20 TABLET | Refills: 0 | Status: SHIPPED | OUTPATIENT
Start: 2024-10-18

## 2024-10-18 RX ADMIN — Medication 10 ML: at 08:20

## 2024-10-18 RX ADMIN — TAMSULOSIN HYDROCHLORIDE 0.4 MG: 0.4 CAPSULE ORAL at 08:20

## 2024-10-18 RX ADMIN — DULOXETINE HYDROCHLORIDE 60 MG: 60 CAPSULE, DELAYED RELEASE ORAL at 08:20

## 2024-10-18 RX ADMIN — ACETAMINOPHEN 325MG 650 MG: 325 TABLET ORAL at 04:02

## 2024-10-18 RX ADMIN — ACETAMINOPHEN 325MG 650 MG: 325 TABLET ORAL at 08:20

## 2024-10-18 NOTE — PROGRESS NOTES
MD ATTESTATION NOTE - Observation progress    The MARIAM and I have discussed this patient's history, physical exam, and treatment plan.  I have reviewed the documentation and personally had a face to face interaction with the patient. I affirm the documentation and agree with the treatment and plan.  The attached note describes my personal findings.        SHARED APC FACE TO FACE: I performed a substantive part of the MDM during the patient's E/M visit. I personally evaluated and examined the patient. I personally made or approved the documented management plan and acknowledge its risk of complications.        Brief HPI: Patient admitted for kidney stone.  Patient appears to have passed stone last night.  Patient states he feels significantly better.  White blood cell count improved this morning.            GENERAL: no acute distress  HENT: nares patent  EYES: no scleral icterus  CV: regular rhythm, normal rate  RESPIRATORY: normal effort  ABDOMEN: soft  MUSCULOSKELETAL: no deformity  NEURO: alert, moves all extremities, follows commands  PSYCH:  calm, cooperative  SKIN: warm, dry    Vital signs and nursing notes reviewed.        Plan: Patient appears to have passed kidney stone. Symptoms improved.

## 2024-10-18 NOTE — PLAN OF CARE
Goal Outcome Evaluation:         Pt was able to pass large stone yesterday. Pain improved overnight. Urology following.

## 2024-10-18 NOTE — DISCHARGE SUMMARY
ED OBSERVATION PROGRESS/DISCHARGE SUMMARY    Date of Admission: 10/16/2024   LOS: 0 days   PCP: Venkatesh Davis MD    Final Diagnosis right ureteral stent/past resolved      Subjective     Hospital Outcome:   John Roy is a 49 y.o. male, with a past medical history including, but not limited to, ADHD, anxiety, RAMSES not requiring CPAP, was admitted to the ED observation unit after experiencing a sudden onset of right sided flank pain.      Urology saw patient in consultation with plan to trial passage of stone and if unsuccessful he will go to the OR tomorrow for stent placement.     10/17/2024:  Urology reevaluated patient.  Plan for ESWL tomorrow. Patient continues to require analgesic medication for pain control, including Norco and IV Dilaudid. Vital signs remain stable. Patient afebrile.     10/18/2024:    Patient passed ureteral stone this morning.  Pain is said to be controlled.  I have informed urology.  Patient's Msiley in the ED observation unit for 50+ hours.  He like to go home.  UA shows no definite infection.  Creatinine is improved to 1.02 which appears baseline for the patient.  Suspect his WBC being elevated was more of a stress response.  Either way, he has had 3 doses of Rocephin and labs are normalizing.  Will DC at this time at the patient's request and have him follow-up with urology on an outpatient basis as needed.    4:30 PM.  There is a message from Dr. Germain to run a stone analysis.  Order placed in [facility] and the patient contacted to drop off stone for analysis.    ROS:  General: no fevers, chills  Respiratory: no cough, dyspnea  Cardiovascular: no chest pain, palpitations  Abdomen: No abdominal pain, nausea, vomiting, or diarrhea  Neurologic: No focal weakness    Objective   Physical Exam:  I have reviewed the vital signs.  Temp:  [98.1 °F (36.7 °C)] 98.1 °F (36.7 °C)  Heart Rate:  [86] 86  Resp:  [18] 18  BP: (105)/(78) 105/78  General Appearance:    Alert, cooperative, no  distress  Head:    Normocephalic, atraumatic  Eyes:    Sclerae anicteric  Neck:   Supple, no mass  Lungs: Clear to auscultation bilaterally, respirations unlabored  Heart: Regular rate and rhythm, S1 and S2 normal, no murmur, rub or gallop  Abdomen:  Soft, nontender, bowel sounds active, nondistended  Extremities: No clubbing, cyanosis, or edema to lower extremities  Pulses:  2+ and symmetric in distal lower extremities  Skin: No rashes   Neurologic: Oriented x3, Normal strength to extremities    Results Review:    I have reviewed the labs, radiology results and diagnostic studies.    Results from last 7 days   Lab Units 10/18/24  0358   WBC 10*3/mm3 12.07*   HEMOGLOBIN g/dL 14.3   HEMATOCRIT % 41.3   PLATELETS 10*3/mm3 199     Results from last 7 days   Lab Units 10/18/24  0358 10/16/24  2310 10/16/24  0217   SODIUM mmol/L 137 136 138   POTASSIUM mmol/L 3.9 3.6 3.5   CHLORIDE mmol/L 101 98 100   CO2 mmol/L 30.7* 28.3 23.6   BUN mg/dL 8 11 16   CREATININE mg/dL 1.02 1.23 1.36*   CALCIUM mg/dL 9.6 9.4 9.9   BILIRUBIN mg/dL  --   --  0.5   ALK PHOS U/L  --   --  68   ALT (SGPT) U/L  --   --  42*   AST (SGOT) U/L  --   --  35   GLUCOSE mg/dL 112* 115* 170*     Imaging Results (Last 24 Hours)       ** No results found for the last 24 hours. **            I have reviewed the medications.  ---------------------------------------------------------------------------------------------  Assessment & Plan   Assessment/Problem List    Kidney stone      Plan:  Kidney stone:  -Mild right-sided hydronephrosis, secondary to a 3 mm stone within the proximal right ureter.  -Pain control with analgesic medication  -Follow-up with urology on outpatient basis as needed.     Anxiety  -Continue duloxetine    Disposition: Discharge    Follow-up after Discharge: [facility]    This note will serve as a discharge summary    Frank Floyd III, PA 10/19/24 07:24 EDT    I have worn appropriate PPE during this patient encounter,  sanitized my hands both with entering and exiting patient's room.      31 minutes has been spent by [facility] providers in the care of this patient while under observation status

## 2024-10-18 NOTE — OUTREACH NOTE
Prep Survey      Flowsheet Row Responses   [facility] patient discharged from? [place]   Is LACE score < 7 ? Yes   Eligibility [facility]   Date of Admission 10/16/24   Date of Discharge 10/18/24   Discharge Disposition Home or Self Care   Discharge diagnosis Kidney stone   Does the patient have one of the following disease processes/diagnoses(primary or secondary)? Other   Prep survey completed? Yes            Pily POP - Registered Nurse

## 2024-10-18 NOTE — DISCHARGE INSTRUCTIONS
Take the Anaprox as needed for renal colic.  Follow-up with [facility] on an outpatient basis as needed.  Return to the ER with any further symptoms.  Follow-up with primary care for all other issues.

## 2024-10-21 ENCOUNTER — TRANSITIONAL CARE MANAGEMENT TELEPHONE ENCOUNTER (OUTPATIENT)
Dept: CALL CENTER | Facility: HOSPITAL | Age: 49
End: 2024-10-21
Payer: COMMERCIAL

## 2024-10-21 LAB
BACTERIA SPEC AEROBE CULT: NORMAL
BACTERIA SPEC AEROBE CULT: NORMAL

## 2024-10-21 NOTE — OUTREACH NOTE
Call Center TCM Note      Flowsheet Row Responses   [facility] patient discharged from? [place]   Does the patient have one of the following disease processes/diagnoses(primary or secondary)? Other   TCM attempt successful? No   Unsuccessful attempts Attempt 1  [Attempted patient and spouse]            Pily Holm RN    10/21/2024, 14:03 EDT

## 2024-10-21 NOTE — OUTREACH NOTE
Call Center TCM Note      Flowsheet Row Responses   [facility] patient discharged from? [place]   Does the patient have one of the following disease processes/diagnoses(primary or secondary)? Other   TCM attempt successful? No   Unsuccessful attempts Attempt 2  [attempted patient/spouse]            Pily Holm RN    10/21/2024, 14:46 EDT

## 2024-10-22 ENCOUNTER — TRANSITIONAL CARE MANAGEMENT TELEPHONE ENCOUNTER (OUTPATIENT)
Dept: CALL CENTER | Facility: HOSPITAL | Age: 49
End: 2024-10-22
Payer: COMMERCIAL

## 2024-10-22 NOTE — OUTREACH NOTE
Call Center TCM Note      Flowsheet Row Responses   [facility] patient discharged from? [place]   Does the patient have one of the following disease processes/diagnoses(primary or secondary)? Other   TCM attempt successful? No   Unsuccessful attempts Attempt 3            Jocelyn Morrow LPN    10/22/2024, 15:59 EDT

## 2024-11-10 DIAGNOSIS — F90.9 ATTENTION DEFICIT HYPERACTIVITY DISORDER (ADHD), UNSPECIFIED ADHD TYPE: ICD-10-CM

## 2024-11-12 RX ORDER — DEXTROAMPHETAMINE SACCHARATE, AMPHETAMINE ASPARTATE MONOHYDRATE, DEXTROAMPHETAMINE SULFATE AND AMPHETAMINE SULFATE 5; 5; 5; 5 MG/1; MG/1; MG/1; MG/1
20 CAPSULE, EXTENDED RELEASE ORAL EVERY MORNING
Qty: 30 CAPSULE | Refills: 0 | Status: SHIPPED | OUTPATIENT
Start: 2024-11-12

## 2024-11-12 RX ORDER — DEXTROAMPHETAMINE SACCHARATE, AMPHETAMINE ASPARTATE, DEXTROAMPHETAMINE SULFATE AND AMPHETAMINE SULFATE 5; 5; 5; 5 MG/1; MG/1; MG/1; MG/1
TABLET ORAL
Qty: 30 TABLET | Refills: 0 | Status: SHIPPED | OUTPATIENT
Start: 2024-11-12

## 2024-11-12 NOTE — TELEPHONE ENCOUNTER
Rx Refill Note  Requested Prescriptions     Pending Prescriptions Disp Refills    amphetamine-dextroamphetamine XR (Adderall XR) 20 MG 24 hr capsule 30 capsule 0     Sig: Take 1 capsule by mouth Every Morning    amphetamine-dextroamphetamine (Adderall) 20 MG tablet 30 tablet 0     Sig: Take 1 tablet in the afternoon      Last office visit with prescribing clinician: 10/11/2024   Last telemedicine visit with prescribing clinician: Visit date not found   Next office visit with prescribing clinician: 10/17/2025  LF 10/11/24

## 2024-12-06 ENCOUNTER — PRE-ADMISSION TESTING (OUTPATIENT)
Dept: PREADMISSION TESTING | Facility: HOSPITAL | Age: 49
End: 2024-12-06
Payer: COMMERCIAL

## 2024-12-06 VITALS
RESPIRATION RATE: 18 BRPM | TEMPERATURE: 98.3 F | HEIGHT: 68 IN | WEIGHT: 167.4 LBS | BODY MASS INDEX: 25.37 KG/M2 | OXYGEN SATURATION: 100 % | SYSTOLIC BLOOD PRESSURE: 120 MMHG | DIASTOLIC BLOOD PRESSURE: 79 MMHG

## 2024-12-06 LAB
ANION GAP SERPL CALCULATED.3IONS-SCNC: 13 MMOL/L (ref 5–15)
BUN SERPL-MCNC: 12 MG/DL (ref 6–20)
BUN/CREAT SERPL: 10.6 (ref 7–25)
CALCIUM SPEC-SCNC: 9.4 MG/DL (ref 8.6–10.5)
CHLORIDE SERPL-SCNC: 101 MMOL/L (ref 98–107)
CO2 SERPL-SCNC: 26 MMOL/L (ref 22–29)
CREAT SERPL-MCNC: 1.13 MG/DL (ref 0.76–1.27)
DEPRECATED RDW RBC AUTO: 41.2 FL (ref 37–54)
EGFRCR SERPLBLD CKD-EPI 2021: 80.2 ML/MIN/1.73
ERYTHROCYTE [DISTWIDTH] IN BLOOD BY AUTOMATED COUNT: 12.8 % (ref 12.3–15.4)
GLUCOSE SERPL-MCNC: 97 MG/DL (ref 65–99)
HCT VFR BLD AUTO: 44.6 % (ref 37.5–51)
HGB BLD-MCNC: 15.4 G/DL (ref 13–17.7)
MCH RBC QN AUTO: 30.8 PG (ref 26.6–33)
MCHC RBC AUTO-ENTMCNC: 34.5 G/DL (ref 31.5–35.7)
MCV RBC AUTO: 89.2 FL (ref 79–97)
PLATELET # BLD AUTO: 262 10*3/MM3 (ref 140–450)
PMV BLD AUTO: 9.7 FL (ref 6–12)
POTASSIUM SERPL-SCNC: 3.6 MMOL/L (ref 3.5–5.2)
RBC # BLD AUTO: 5 10*6/MM3 (ref 4.14–5.8)
SODIUM SERPL-SCNC: 140 MMOL/L (ref 136–145)
WBC NRBC COR # BLD AUTO: 8.92 10*3/MM3 (ref 3.4–10.8)

## 2024-12-06 PROCEDURE — 85027 COMPLETE CBC AUTOMATED: CPT

## 2024-12-06 PROCEDURE — 80048 BASIC METABOLIC PNL TOTAL CA: CPT

## 2024-12-06 PROCEDURE — 36415 COLL VENOUS BLD VENIPUNCTURE: CPT

## 2024-12-06 NOTE — DISCHARGE INSTRUCTIONS
Take the following medications the morning of surgery with a small sip of water:  DULOXETINE    If you are on prescription narcotic pain medication to control your pain you may also take that medication the morning of surgery.    General Instructions:  Do not eat or drink anything after midnight the night before surgery.  Infants may have breast milk up to four hours before surgery.  Infants drinking formula may drink formula up to six hours before surgery.   Patients who avoid smoking, chewing tobacco and alcohol for 4 weeks prior to surgery have a reduced risk of post-operative complications.  Quit smoking as many days before surgery as you can.  Do not smoke, use chewing tobacco or drink alcohol the day of surgery.   If applicable bring your C-PAP/ BI-PAP machine in with you to preop day of surgery.  Bring any papers given to you in the doctor’s office.  Wear clean comfortable clothes.  Do not wear contact lenses, false eyelashes or make-up.  Bring a case for your glasses.   Bring crutches or walker if applicable.  Remove all piercings.  Leave jewelry and any other valuables at home.  Hair extensions with metal clips must be removed prior to surgery.  The Pre-Admission Testing nurse will instruct you to bring medications if unable to obtain an accurate list in Pre-Admission Testing.        If you were given a blood bank ID arm band remember to bring it with you the day of surgery.    Preventing a Surgical Site Infection:  For 2 to 3 days before surgery, avoid shaving with a razor because the razor can irritate skin and make it easier to develop an infection.    Any areas of open skin can increase the risk of a post-operative wound infection by allowing bacteria to enter and travel throughout the body.  Notify your surgeon if you have any skin wounds / rashes even if it is not near the expected surgical site.  The area will need assessed to determine if surgery should be delayed until it is healed.  The night  prior to surgery shower using a fresh bar of anti-bacterial soap (such as Dial) and clean washcloth.  Sleep in a clean bed with clean clothing.  Do not allow pets to sleep with you.  Shower on the morning of surgery using a fresh bar of anti-bacterial soap (such as Dial) and clean washcloth.  Dry with a clean towel and dress in clean clothing.  Ask your surgeon if you will be receiving antibiotics prior to surgery.  Make sure you, your family, and all healthcare providers clean their hands with soap and water or an alcohol based hand  before caring for you or your wound.    Day of surgery:  Your arrival time is approximately two hours before your scheduled surgery time.  Please note if you have an early arrival time the surgery doors do not open before 5:00 AM.  Upon arrival, a Pre-op nurse and Anesthesiologist will review your health history, obtain vital signs, and answer questions you may have.  The only belongings needed at this time will be your home medications and if applicable your C-PAP/BI-PAP machine.  A Pre-op nurse will start an IV and you may receive medication in preparation for surgery, including something to help you relax.      Please be aware that surgery does come with discomfort.  We want to make every effort to control your discomfort so please discuss any uncontrolled symptoms with your nurse.   Your doctor will most likely have prescribed pain medications.      If you are going home after surgery you will receive individualized written care instructions before being discharged.  A responsible adult must drive you to and from the hospital on the day of your surgery and ideally stay with you through the night.  Discharge prescriptions can be filled by the hospital pharmacy during regular pharmacy hours.  If you are having surgery late in the day/evening your prescription may be e-prescribed to your pharmacy.  Please verify your pharmacy hours or chose a 24 hour pharmacy to avoid not  having access to your prescription because your pharmacy has closed for the day.    If you are staying overnight following surgery, you will be transported to your hospital room following the recovery period.  [facility] has all private rooms.    If you have any questions please call Pre-Admission Testing at [phone].  Deductibles and co-payments are collected on the day of service. Please be prepared to pay the required co-pay, deductible or deposit on the day of service as defined by your plan.    Call your surgeon immediately if you experience any of the following symptoms:  Sore Throat  Shortness of Breath or difficulty breathing  Cough  Chills  Body soreness or muscle pain  Headache  Fever  New loss of taste or smell  Do not arrive for your surgery ill.  Your procedure will need to be rescheduled to another time.  You will need to call your physician before the day of surgery to avoid any unnecessary exposure to hospital staff as well as other patients.

## 2024-12-11 DIAGNOSIS — F90.9 ATTENTION DEFICIT HYPERACTIVITY DISORDER (ADHD), UNSPECIFIED ADHD TYPE: ICD-10-CM

## 2024-12-12 RX ORDER — DEXTROAMPHETAMINE SACCHARATE, AMPHETAMINE ASPARTATE MONOHYDRATE, DEXTROAMPHETAMINE SULFATE AND AMPHETAMINE SULFATE 5; 5; 5; 5 MG/1; MG/1; MG/1; MG/1
20 CAPSULE, EXTENDED RELEASE ORAL EVERY MORNING
Qty: 30 CAPSULE | Refills: 0 | Status: SHIPPED | OUTPATIENT
Start: 2024-12-12

## 2024-12-12 RX ORDER — DEXTROAMPHETAMINE SACCHARATE, AMPHETAMINE ASPARTATE, DEXTROAMPHETAMINE SULFATE AND AMPHETAMINE SULFATE 5; 5; 5; 5 MG/1; MG/1; MG/1; MG/1
TABLET ORAL
Qty: 30 TABLET | Refills: 0 | Status: SHIPPED | OUTPATIENT
Start: 2024-12-12

## 2024-12-12 NOTE — TELEPHONE ENCOUNTER
Rx Refill Note  Requested Prescriptions     Pending Prescriptions Disp Refills    amphetamine-dextroamphetamine XR (Adderall XR) 20 MG 24 hr capsule 30 capsule 0     Sig: Take 1 capsule by mouth Every Morning      Last office visit with prescribing clinician: 10/11/2024   Last telemedicine visit with prescribing clinician: Visit date not found   Next office visit with prescribing clinician: 10/17/2025  LF  11/12/2024

## 2025-01-09 DIAGNOSIS — F90.9 ATTENTION DEFICIT HYPERACTIVITY DISORDER (ADHD), UNSPECIFIED ADHD TYPE: ICD-10-CM

## 2025-01-09 RX ORDER — DEXTROAMPHETAMINE SACCHARATE, AMPHETAMINE ASPARTATE MONOHYDRATE, DEXTROAMPHETAMINE SULFATE AND AMPHETAMINE SULFATE 5; 5; 5; 5 MG/1; MG/1; MG/1; MG/1
20 CAPSULE, EXTENDED RELEASE ORAL EVERY MORNING
Qty: 30 CAPSULE | Refills: 0 | Status: SHIPPED | OUTPATIENT
Start: 2025-01-12

## 2025-01-09 RX ORDER — DEXTROAMPHETAMINE SACCHARATE, AMPHETAMINE ASPARTATE, DEXTROAMPHETAMINE SULFATE AND AMPHETAMINE SULFATE 5; 5; 5; 5 MG/1; MG/1; MG/1; MG/1
TABLET ORAL
Qty: 30 TABLET | Refills: 0 | Status: SHIPPED | OUTPATIENT
Start: 2025-01-12

## 2025-01-09 NOTE — TELEPHONE ENCOUNTER
Rx Refill Note  Requested Prescriptions     Pending Prescriptions Disp Refills    amphetamine-dextroamphetamine XR (Adderall XR) 20 MG 24 hr capsule 30 capsule 0     Sig: Take 1 capsule by mouth Every Morning      Last office visit with prescribing clinician: 10/11/2024   Last telemedicine visit with prescribing clinician: Visit date not found   Next office visit with prescribing clinician: 10/17/2025  LF  12/12/24

## 2025-01-31 DIAGNOSIS — F90.9 ATTENTION DEFICIT HYPERACTIVITY DISORDER (ADHD), UNSPECIFIED ADHD TYPE: ICD-10-CM

## 2025-01-31 NOTE — TELEPHONE ENCOUNTER
Rx Refill Note  Requested Prescriptions     Pending Prescriptions Disp Refills    amphetamine-dextroamphetamine (Adderall) 20 MG tablet 30 tablet 0     Sig: Take 1 tablet in the afternoon      Last office visit with prescribing clinician: 10/11/2024   Last telemedicine visit with prescribing clinician: Visit date not found   Next office visit with prescribing clinician: 10/17/2025          Urine Drug Screen - Urine, Clean Catch (10/11/2024 13:16)     Edna Pastor LPN  01/31/25, 08:04 EST   spouse

## 2025-02-03 RX ORDER — DEXTROAMPHETAMINE SACCHARATE, AMPHETAMINE ASPARTATE, DEXTROAMPHETAMINE SULFATE AND AMPHETAMINE SULFATE 5; 5; 5; 5 MG/1; MG/1; MG/1; MG/1
TABLET ORAL
Qty: 30 TABLET | Refills: 0 | Status: SHIPPED | OUTPATIENT
Start: 2025-02-13

## 2025-02-12 DIAGNOSIS — F90.9 ATTENTION DEFICIT HYPERACTIVITY DISORDER (ADHD), UNSPECIFIED ADHD TYPE: ICD-10-CM

## 2025-02-13 RX ORDER — DEXTROAMPHETAMINE SACCHARATE, AMPHETAMINE ASPARTATE MONOHYDRATE, DEXTROAMPHETAMINE SULFATE AND AMPHETAMINE SULFATE 5; 5; 5; 5 MG/1; MG/1; MG/1; MG/1
20 CAPSULE, EXTENDED RELEASE ORAL EVERY MORNING
Qty: 30 CAPSULE | Refills: 0 | OUTPATIENT
Start: 2025-02-13

## 2025-02-18 DIAGNOSIS — F90.9 ATTENTION DEFICIT HYPERACTIVITY DISORDER (ADHD), UNSPECIFIED ADHD TYPE: ICD-10-CM

## 2025-02-19 RX ORDER — DEXTROAMPHETAMINE SACCHARATE, AMPHETAMINE ASPARTATE MONOHYDRATE, DEXTROAMPHETAMINE SULFATE AND AMPHETAMINE SULFATE 5; 5; 5; 5 MG/1; MG/1; MG/1; MG/1
20 CAPSULE, EXTENDED RELEASE ORAL EVERY MORNING
Qty: 30 CAPSULE | Refills: 0 | Status: SHIPPED | OUTPATIENT
Start: 2025-02-19

## 2025-03-11 ENCOUNTER — OFFICE VISIT (OUTPATIENT)
Dept: INTERNAL MEDICINE | Facility: CLINIC | Age: 50
End: 2025-03-11
Payer: COMMERCIAL

## 2025-03-11 VITALS
HEART RATE: 87 BPM | TEMPERATURE: 97.1 F | BODY MASS INDEX: 25.79 KG/M2 | WEIGHT: 170.2 LBS | DIASTOLIC BLOOD PRESSURE: 64 MMHG | SYSTOLIC BLOOD PRESSURE: 116 MMHG | HEIGHT: 68 IN | OXYGEN SATURATION: 98 %

## 2025-03-11 DIAGNOSIS — R40.0 SLEEPINESS: ICD-10-CM

## 2025-03-11 DIAGNOSIS — M54.12 CERVICAL RADICULOPATHY: Primary | ICD-10-CM

## 2025-03-11 DIAGNOSIS — F90.9 ATTENTION DEFICIT HYPERACTIVITY DISORDER (ADHD), UNSPECIFIED ADHD TYPE: ICD-10-CM

## 2025-03-11 DIAGNOSIS — G56.02 CARPAL TUNNEL SYNDROME ON LEFT: ICD-10-CM

## 2025-03-11 PROCEDURE — 99214 OFFICE O/P EST MOD 30 MIN: CPT | Performed by: FAMILY MEDICINE

## 2025-03-11 RX ORDER — DEXTROAMPHETAMINE SACCHARATE, AMPHETAMINE ASPARTATE MONOHYDRATE, DEXTROAMPHETAMINE SULFATE AND AMPHETAMINE SULFATE 5; 5; 5; 5 MG/1; MG/1; MG/1; MG/1
20 CAPSULE, EXTENDED RELEASE ORAL EVERY MORNING
Qty: 30 CAPSULE | Refills: 0 | Status: SHIPPED | OUTPATIENT
Start: 2025-05-10

## 2025-03-11 RX ORDER — DEXTROAMPHETAMINE SACCHARATE, AMPHETAMINE ASPARTATE MONOHYDRATE, DEXTROAMPHETAMINE SULFATE AND AMPHETAMINE SULFATE 5; 5; 5; 5 MG/1; MG/1; MG/1; MG/1
20 CAPSULE, EXTENDED RELEASE ORAL EVERY MORNING
Qty: 30 CAPSULE | Refills: 0 | Status: SHIPPED | OUTPATIENT
Start: 2025-03-11

## 2025-03-11 RX ORDER — DEXTROAMPHETAMINE SACCHARATE, AMPHETAMINE ASPARTATE, DEXTROAMPHETAMINE SULFATE AND AMPHETAMINE SULFATE 5; 5; 5; 5 MG/1; MG/1; MG/1; MG/1
TABLET ORAL
Qty: 30 TABLET | Refills: 0 | Status: SHIPPED | OUTPATIENT
Start: 2025-04-10

## 2025-03-11 RX ORDER — DEXTROAMPHETAMINE SACCHARATE, AMPHETAMINE ASPARTATE MONOHYDRATE, DEXTROAMPHETAMINE SULFATE AND AMPHETAMINE SULFATE 5; 5; 5; 5 MG/1; MG/1; MG/1; MG/1
20 CAPSULE, EXTENDED RELEASE ORAL EVERY MORNING
Qty: 30 CAPSULE | Refills: 0 | Status: SHIPPED | OUTPATIENT
Start: 2025-04-10

## 2025-03-11 RX ORDER — DEXTROAMPHETAMINE SACCHARATE, AMPHETAMINE ASPARTATE, DEXTROAMPHETAMINE SULFATE AND AMPHETAMINE SULFATE 5; 5; 5; 5 MG/1; MG/1; MG/1; MG/1
TABLET ORAL
Qty: 30 TABLET | Refills: 0 | Status: SHIPPED | OUTPATIENT
Start: 2025-03-11

## 2025-03-11 RX ORDER — DEXTROAMPHETAMINE SACCHARATE, AMPHETAMINE ASPARTATE, DEXTROAMPHETAMINE SULFATE AND AMPHETAMINE SULFATE 5; 5; 5; 5 MG/1; MG/1; MG/1; MG/1
TABLET ORAL
Qty: 30 TABLET | Refills: 0 | Status: SHIPPED | OUTPATIENT
Start: 2025-05-10

## 2025-03-11 NOTE — PROGRESS NOTES
Date of Encounter: 2025  Patient: John Roy,  1975    Subjective   History of Presenting Illness  Chief complaint: Follow-up neck pain, others    He has a history of cervical radiculopathy, has been having some occasionally tingling from his left upper arm down to his thumb particularly with certain sleeping patterns.  No weakness in the hand.  Neck pain not currently bothering him.  Physical therapy has helped.  No recent injury.    Attention deficit, doing well with medication but does find himself very sleepy.  He does have a history of mild sleep apnea with recommendations for laryngeal device.  He finds himself getting very drowsy with driving and is concerned about risks if this were to worsen.  He has been under a lot of deadlines at work and is sleeping sometimes less than 4 hours nightly.    The following portions of the patient's history were reviewed and updated as appropriate: allergies, current medications, past family history, past medical history, past social history, past surgical history and problem list.    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    Mixed hyperlipidemia    Colon polyps    Family history of prostate cancer in father    Sebaceous cyst    Synovial cyst    Generalized anxiety disorder    Cervical radiculopathy    Attention deficit disorder    Family history of colon cancer    Urinary hesitancy    Chronic pain of left knee    Constipation    Kidney stone    Prediabetes    Carpal tunnel syndrome on left     Past Medical History:   Diagnosis Date    ADHD (attention deficit hyperactivity disorder) 24    Anxiety 24    Chronic foot pain, left 2020    Colon polyp 2009    Epigastric discomfort 2020    H/O removal of neck cyst 2023    Left cervical radiculopathy 10/06/2022    Pinched nerve in neck     NECK PAIN, RADIATES DOWN LEFT ARM    Right kidney stone     Sleep apnea     NO CPAP    Viral gastritis  08/29/2017     Past Surgical History:   Procedure Laterality Date    COLONOSCOPY      COLONOSCOPY N/A 09/29/2023    Procedure: COLONOSCOPY to cecum with hot snare polypectomy;  Surgeon: John Lechuga Jr., MD;  Location: [facility] ENDOSCOPY;  Service: General;  Laterality: N/A;  Pre: hx polyps, family hx colon cancer  Post: polyp    UNDESCENDED TESTICLE EXPLORATION      VASECTOMY  2016     Family History   Problem Relation Age of Onset    Cancer Mother     Early death Mother         Age 54    Learning disabilities Mother     Prostate cancer Father     Cancer Father         Prostate Cancer    COPD Father     Anxiety disorder Father     Colon cancer Father     Early death Father         Age 74    Colon cancer Paternal Uncle     Depression Maternal Grandmother     Colon cancer Paternal Grandmother     Malig Hyperthermia Neg Hx        Current Outpatient Medications:     amphetamine-dextroamphetamine (Adderall) 20 MG tablet, Take 1 tablet in the afternoon, Disp: 30 tablet, Rfl: 0    [START ON 4/10/2025] amphetamine-dextroamphetamine (Adderall) 20 MG tablet, Take 1 tablet in the afternoon, Disp: 30 tablet, Rfl: 0    [START ON 5/10/2025] amphetamine-dextroamphetamine (Adderall) 20 MG tablet, Take 1 tablet in the afternoon, Disp: 30 tablet, Rfl: 0    amphetamine-dextroamphetamine XR (Adderall XR) 20 MG 24 hr capsule, Take 1 capsule by mouth Every Morning, Disp: 30 capsule, Rfl: 0    [START ON 4/10/2025] amphetamine-dextroamphetamine XR (Adderall XR) 20 MG 24 hr capsule, Take 1 capsule by mouth Every Morning, Disp: 30 capsule, Rfl: 0    [START ON 5/10/2025] amphetamine-dextroamphetamine XR (Adderall XR) 20 MG 24 hr capsule, Take 1 capsule by mouth Every Morning, Disp: 30 capsule, Rfl: 0    DULoxetine (CYMBALTA) 60 MG capsule, Take 1 tab PO QD for mood (Patient taking differently: Take 1 capsule by mouth Daily. Take 1 tab PO QD for mood), Disp: 90 capsule, Rfl: 3  No Known Allergies  Social History     Tobacco Use    Smoking  "status: Former     Current packs/day: 0.00     Types: Cigarettes     Quit date: 1996     Years since quittin.2    Smokeless tobacco: Former   Vaping Use    Vaping status: Never Used   Substance Use Topics    Alcohol use: Yes     Alcohol/week: 2.0 standard drinks of alcohol     Types: 2 Cans of beer per week     Comment: socially    Drug use: No          Objective   Physical Exam  Vitals:    25 1351   BP: 116/64   BP Location: Left arm   Patient Position: Sitting   Cuff Size: Adult   Pulse: 87   Temp: 97.1 °F (36.2 °C)   TempSrc: Infrared   SpO2: 98%   Weight: 77.2 kg (170 lb 3.2 oz)   Height: 172.7 cm (68\")     Body mass index is 25.88 kg/m².    Constitutional: NAD.  Psychiatric: Normal affect. Normal thought content.  Neurologic: Positive tinnel test at the left wrist, positive Phalen's test, positive Spurling test, positive raised hand test. no thenar wasting     Assessment & Plan   Assessment and Plan  Pleasant 49-year-old male with hyperlipidemia, generalized anxiety disorder, ADHD, mild RAMSES not requiring CPAP, family history of prostate cancer and colon cancer, chronic left knee pain, who presents for the following:     Diagnoses and all orders for this visit:    1. Cervical radiculopathy (Primary): Positive Spurling and a positive tinnel/Phalen test.  He probably has a bit of neck arthritis as well as carpal tunnel.  I recommend empiric carpal tunnel bracing since he is not having neck pain at this time.  If symptoms persist and not improving with these measures would recommend EMG/NCV.  Recommend prednisone for any acute onset of cervical radiculopathy in the future to get on top of it  2. Carpal tunnel syndrome on left    3. Sleepiness: May just be sleep deprivation.  Recommend he take his Adderall XR as soon as he wakes up in the morning.  If symptoms not improving with time or better sleep hours would consider follow-up polysomnography versus rotation from stimulant to wakefulness promoting " medicine.    His Timmy is appropriate and UDS is up-to-date    4. Attention deficit hyperactivity disorder (ADHD), unspecified ADHD type  -     amphetamine-dextroamphetamine XR (Adderall XR) 20 MG 24 hr capsule; Take 1 capsule by mouth Every Morning  Dispense: 30 capsule; Refill: 0  -     amphetamine-dextroamphetamine (Adderall) 20 MG tablet; Take 1 tablet in the afternoon  Dispense: 30 tablet; Refill: 0  -     amphetamine-dextroamphetamine (Adderall) 20 MG tablet; Take 1 tablet in the afternoon  Dispense: 30 tablet; Refill: 0  -     amphetamine-dextroamphetamine XR (Adderall XR) 20 MG 24 hr capsule; Take 1 capsule by mouth Every Morning  Dispense: 30 capsule; Refill: 0  -     amphetamine-dextroamphetamine XR (Adderall XR) 20 MG 24 hr capsule; Take 1 capsule by mouth Every Morning  Dispense: 30 capsule; Refill: 0  -     amphetamine-dextroamphetamine (Adderall) 20 MG tablet; Take 1 tablet in the afternoon  Dispense: 30 tablet; Refill: 0    Venkatesh Davis MD  Family Medicine  O: [phone]    Disclaimer: Parts of this note were dictated by speech recognition. Minor errors in transcription may be present. Please call if questions.

## (undated) DEVICE — THE SINGLE USE ETRAP – POLYP TRAP IS USED FOR SUCTION RETRIEVAL OF ENDOSCOPICALLY REMOVED POLYPS.: Brand: ETRAP

## (undated) DEVICE — ADAPT CLN BIOGUARD AIR/H2O DISP

## (undated) DEVICE — SNAR POLYP SENSATION STDOVL 27 240 BX40

## (undated) DEVICE — CANN O2 ETCO2 FITS ALL CONN CO2 SMPL A/ 7IN DISP LF

## (undated) DEVICE — SENSR O2 OXIMAX FNGR A/ 18IN NONSTR

## (undated) DEVICE — KT ORCA ORCAPOD DISP STRL

## (undated) DEVICE — TUBING, SUCTION, 1/4" X 10', STRAIGHT: Brand: MEDLINE

## (undated) DEVICE — LN SMPL CO2 SHTRM SD STREAM W/M LUER